# Patient Record
Sex: FEMALE | Race: BLACK OR AFRICAN AMERICAN | Employment: UNEMPLOYED | URBAN - METROPOLITAN AREA
[De-identification: names, ages, dates, MRNs, and addresses within clinical notes are randomized per-mention and may not be internally consistent; named-entity substitution may affect disease eponyms.]

---

## 2020-06-28 ENCOUNTER — HOSPITAL ENCOUNTER (INPATIENT)
Age: 25
LOS: 1 days | Discharge: HOME | DRG: 638 | End: 2020-06-30
Attending: EMERGENCY MEDICINE | Admitting: INTERNAL MEDICINE

## 2020-06-28 LAB
-: NORMAL
ABSOLUTE EOS #: <0.03 K/UL (ref 0–0.44)
ABSOLUTE IMMATURE GRANULOCYTE: 0.23 K/UL (ref 0–0.3)
ABSOLUTE LYMPH #: 2.69 K/UL (ref 1.1–3.7)
ABSOLUTE MONO #: 0.74 K/UL (ref 0.1–1.2)
ALLEN TEST: ABNORMAL
ANION GAP: 13 MMOL/L (ref 7–16)
BASOPHILS # BLD: 1 % (ref 0–2)
BASOPHILS ABSOLUTE: 0.08 K/UL (ref 0–0.2)
DIFFERENTIAL TYPE: ABNORMAL
EOSINOPHILS RELATIVE PERCENT: 0 % (ref 1–4)
FIO2: ABNORMAL
GFR NON-AFRICAN AMERICAN: >60 ML/MIN
GFR SERPL CREATININE-BSD FRML MDRD: >60 ML/MIN
GFR SERPL CREATININE-BSD FRML MDRD: NORMAL ML/MIN/{1.73_M2}
GLUCOSE BLD-MCNC: 420 MG/DL (ref 65–105)
GLUCOSE BLD-MCNC: 449 MG/DL (ref 74–100)
HCG QUALITATIVE: NEGATIVE
HCO3 VENOUS: 4.8 MMOL/L (ref 22–29)
HCT VFR BLD CALC: 53.2 % (ref 36.3–47.1)
HEMOGLOBIN: 16.4 G/DL (ref 11.9–15.1)
IMMATURE GRANULOCYTES: 2 %
LYMPHOCYTES # BLD: 21 % (ref 24–43)
MCH RBC QN AUTO: 28.7 PG (ref 25.2–33.5)
MCHC RBC AUTO-ENTMCNC: 30.8 G/DL (ref 28.4–34.8)
MCV RBC AUTO: 93 FL (ref 82.6–102.9)
MODE: ABNORMAL
MONOCYTES # BLD: 6 % (ref 3–12)
NEGATIVE BASE EXCESS, VEN: 22 (ref 0–2)
NRBC AUTOMATED: 0 PER 100 WBC
O2 DEVICE/FLOW/%: ABNORMAL
O2 SAT, VEN: 92 % (ref 60–85)
PATIENT TEMP: ABNORMAL
PCO2, VEN: 14.7 MM HG (ref 41–51)
PDW BLD-RTO: 16.2 % (ref 11.8–14.4)
PH VENOUS: 7.12 (ref 7.32–7.43)
PLATELET # BLD: 316 K/UL (ref 138–453)
PLATELET ESTIMATE: ABNORMAL
PMV BLD AUTO: 11.3 FL (ref 8.1–13.5)
PO2, VEN: 81.4 MM HG (ref 30–50)
POC CHLORIDE: 115 MMOL/L (ref 98–107)
POC CREATININE: 1.02 MG/DL (ref 0.51–1.19)
POC HEMATOCRIT: 55 % (ref 36–46)
POC HEMOGLOBIN: 18.8 G/DL (ref 12–16)
POC IONIZED CALCIUM: 1.06 MMOL/L (ref 1.15–1.33)
POC LACTIC ACID: 1.96 MMOL/L (ref 0.56–1.39)
POC PCO2 TEMP: ABNORMAL MM HG
POC PH TEMP: ABNORMAL
POC PO2 TEMP: ABNORMAL MM HG
POC POTASSIUM: 6.9 MMOL/L (ref 3.5–4.5)
POC SODIUM: 133 MMOL/L (ref 138–146)
POSITIVE BASE EXCESS, VEN: ABNORMAL (ref 0–3)
RBC # BLD: 5.72 M/UL (ref 3.95–5.11)
RBC # BLD: ABNORMAL 10*6/UL
REASON FOR REJECTION: NORMAL
SAMPLE SITE: ABNORMAL
SEG NEUTROPHILS: 70 % (ref 36–65)
SEGMENTED NEUTROPHILS ABSOLUTE COUNT: 9.11 K/UL (ref 1.5–8.1)
SERUM OSMOLALITY: 327 MOSM/KG (ref 275–295)
TOTAL CO2, VENOUS: 5 MMOL/L (ref 23–30)
WBC # BLD: 12.9 K/UL (ref 3.5–11.3)
WBC # BLD: ABNORMAL 10*3/UL
ZZ NTE CLEAN UP: ORDERED TEST: NORMAL
ZZ NTE WITH NAME CLEAN UP: SPECIMEN SOURCE: NORMAL

## 2020-06-28 PROCEDURE — 82803 BLOOD GASES ANY COMBINATION: CPT

## 2020-06-28 PROCEDURE — 80053 COMPREHEN METABOLIC PANEL: CPT

## 2020-06-28 PROCEDURE — 82947 ASSAY GLUCOSE BLOOD QUANT: CPT

## 2020-06-28 PROCEDURE — 82330 ASSAY OF CALCIUM: CPT

## 2020-06-28 PROCEDURE — 93005 ELECTROCARDIOGRAM TRACING: CPT | Performed by: STUDENT IN AN ORGANIZED HEALTH CARE EDUCATION/TRAINING PROGRAM

## 2020-06-28 PROCEDURE — 84132 ASSAY OF SERUM POTASSIUM: CPT

## 2020-06-28 PROCEDURE — 84703 CHORIONIC GONADOTROPIN ASSAY: CPT

## 2020-06-28 PROCEDURE — 83930 ASSAY OF BLOOD OSMOLALITY: CPT

## 2020-06-28 PROCEDURE — 85025 COMPLETE CBC W/AUTO DIFF WBC: CPT

## 2020-06-28 PROCEDURE — 81001 URINALYSIS AUTO W/SCOPE: CPT

## 2020-06-28 PROCEDURE — 85014 HEMATOCRIT: CPT

## 2020-06-28 PROCEDURE — 99285 EMERGENCY DEPT VISIT HI MDM: CPT

## 2020-06-28 PROCEDURE — 2580000003 HC RX 258: Performed by: STUDENT IN AN ORGANIZED HEALTH CARE EDUCATION/TRAINING PROGRAM

## 2020-06-28 PROCEDURE — 83605 ASSAY OF LACTIC ACID: CPT

## 2020-06-28 PROCEDURE — 84295 ASSAY OF SERUM SODIUM: CPT

## 2020-06-28 PROCEDURE — 82435 ASSAY OF BLOOD CHLORIDE: CPT

## 2020-06-28 PROCEDURE — 83735 ASSAY OF MAGNESIUM: CPT

## 2020-06-28 PROCEDURE — 6360000002 HC RX W HCPCS: Performed by: STUDENT IN AN ORGANIZED HEALTH CARE EDUCATION/TRAINING PROGRAM

## 2020-06-28 PROCEDURE — 82565 ASSAY OF CREATININE: CPT

## 2020-06-28 RX ORDER — 0.9 % SODIUM CHLORIDE 0.9 %
1000 INTRAVENOUS SOLUTION INTRAVENOUS ONCE
Status: COMPLETED | OUTPATIENT
Start: 2020-06-28 | End: 2020-06-29

## 2020-06-28 RX ORDER — NICOTINE POLACRILEX 4 MG
15 LOZENGE BUCCAL PRN
Status: DISCONTINUED | OUTPATIENT
Start: 2020-06-28 | End: 2020-06-30 | Stop reason: HOSPADM

## 2020-06-28 RX ORDER — ONDANSETRON 2 MG/ML
4 INJECTION INTRAMUSCULAR; INTRAVENOUS ONCE
Status: COMPLETED | OUTPATIENT
Start: 2020-06-28 | End: 2020-06-28

## 2020-06-28 RX ORDER — HUMAN INSULIN 100 [IU]/ML
INJECTION, SUSPENSION SUBCUTANEOUS
Status: ON HOLD | COMMUNITY
End: 2020-06-30 | Stop reason: SDUPTHER

## 2020-06-28 RX ORDER — KETOROLAC TROMETHAMINE 15 MG/ML
15 INJECTION, SOLUTION INTRAMUSCULAR; INTRAVENOUS ONCE
Status: COMPLETED | OUTPATIENT
Start: 2020-06-28 | End: 2020-06-28

## 2020-06-28 RX ORDER — DEXTROSE MONOHYDRATE 50 MG/ML
100 INJECTION, SOLUTION INTRAVENOUS PRN
Status: DISCONTINUED | OUTPATIENT
Start: 2020-06-28 | End: 2020-06-30 | Stop reason: HOSPADM

## 2020-06-28 RX ORDER — DEXTROSE MONOHYDRATE 25 G/50ML
12.5 INJECTION, SOLUTION INTRAVENOUS PRN
Status: DISCONTINUED | OUTPATIENT
Start: 2020-06-28 | End: 2020-06-30 | Stop reason: HOSPADM

## 2020-06-28 RX ADMIN — ONDANSETRON 4 MG: 2 INJECTION, SOLUTION INTRAMUSCULAR; INTRAVENOUS at 22:50

## 2020-06-28 RX ADMIN — KETOROLAC TROMETHAMINE 15 MG: 15 INJECTION, SOLUTION INTRAMUSCULAR; INTRAVENOUS at 23:28

## 2020-06-28 RX ADMIN — SODIUM CHLORIDE 1000 ML: 9 INJECTION, SOLUTION INTRAVENOUS at 22:45

## 2020-06-28 ASSESSMENT — ENCOUNTER SYMPTOMS
SORE THROAT: 0
VOMITING: 1
ABDOMINAL PAIN: 1
ABDOMINAL DISTENTION: 0
BACK PAIN: 0
WHEEZING: 0
RHINORRHEA: 0
CONSTIPATION: 0
PHOTOPHOBIA: 0
TROUBLE SWALLOWING: 0
SHORTNESS OF BREATH: 0
DIARRHEA: 0
NAUSEA: 1
CHEST TIGHTNESS: 0

## 2020-06-28 ASSESSMENT — PAIN DESCRIPTION - ONSET: ONSET: ON-GOING

## 2020-06-28 ASSESSMENT — PAIN DESCRIPTION - FREQUENCY: FREQUENCY: INTERMITTENT

## 2020-06-28 ASSESSMENT — PAIN DESCRIPTION - PAIN TYPE: TYPE: ACUTE PAIN

## 2020-06-28 ASSESSMENT — PAIN DESCRIPTION - LOCATION: LOCATION: ABDOMEN

## 2020-06-28 ASSESSMENT — PAIN SCALES - GENERAL
PAINLEVEL_OUTOF10: 9
PAINLEVEL_OUTOF10: 9

## 2020-06-28 ASSESSMENT — PAIN DESCRIPTION - DESCRIPTORS: DESCRIPTORS: ACHING

## 2020-06-28 ASSESSMENT — PAIN DESCRIPTION - ORIENTATION: ORIENTATION: LEFT

## 2020-06-29 PROBLEM — N17.9 AKI (ACUTE KIDNEY INJURY) (HCC): Status: ACTIVE | Noted: 2020-06-29

## 2020-06-29 PROBLEM — E10.10 DIABETIC KETOACIDOSIS WITHOUT COMA ASSOCIATED WITH TYPE 1 DIABETES MELLITUS (HCC): Status: ACTIVE | Noted: 2020-06-29

## 2020-06-29 PROBLEM — Z91.199 NONCOMPLIANCE: Status: ACTIVE | Noted: 2020-06-29

## 2020-06-29 LAB
-: NORMAL
ALBUMIN SERPL-MCNC: 4.2 G/DL (ref 3.5–5.2)
ALBUMIN/GLOBULIN RATIO: 1.1 (ref 1–2.5)
ALLEN TEST: ABNORMAL
ALLEN TEST: ABNORMAL
ALLEN TEST: POSITIVE
ALP BLD-CCNC: 110 U/L (ref 35–104)
ALT SERPL-CCNC: 18 U/L (ref 5–33)
AMORPHOUS: NORMAL
ANION GAP SERPL CALCULATED.3IONS-SCNC: 16 MMOL/L (ref 9–17)
ANION GAP SERPL CALCULATED.3IONS-SCNC: 19 MMOL/L (ref 9–17)
ANION GAP SERPL CALCULATED.3IONS-SCNC: ABNORMAL MMOL/L (ref 9–17)
ANION GAP: 13 MMOL/L (ref 7–16)
ANION GAP: 15 MMOL/L (ref 7–16)
AST SERPL-CCNC: 30 U/L
BACTERIA: NORMAL
BETA-HYDROXYBUTYRATE: 9.32 MMOL/L (ref 0.02–0.27)
BETA-HYDROXYBUTYRATE: 9.76 MMOL/L (ref 0.02–0.27)
BILIRUB SERPL-MCNC: 0.17 MG/DL (ref 0.3–1.2)
BILIRUBIN URINE: NEGATIVE
BUN BLDV-MCNC: 3 MG/DL (ref 6–20)
BUN BLDV-MCNC: 3 MG/DL (ref 6–20)
BUN BLDV-MCNC: 4 MG/DL (ref 6–20)
BUN BLDV-MCNC: 5 MG/DL (ref 6–20)
BUN BLDV-MCNC: 7 MG/DL (ref 6–20)
BUN/CREAT BLD: ABNORMAL (ref 9–20)
CALCIUM SERPL-MCNC: 7.8 MG/DL (ref 8.6–10.4)
CALCIUM SERPL-MCNC: 7.9 MG/DL (ref 8.6–10.4)
CALCIUM SERPL-MCNC: 8.4 MG/DL (ref 8.6–10.4)
CALCIUM SERPL-MCNC: 8.4 MG/DL (ref 8.6–10.4)
CALCIUM SERPL-MCNC: 8.5 MG/DL (ref 8.6–10.4)
CASTS UA: NORMAL /LPF (ref 0–8)
CHLORIDE BLD-SCNC: 100 MMOL/L (ref 98–107)
CHLORIDE BLD-SCNC: 107 MMOL/L (ref 98–107)
CHLORIDE BLD-SCNC: 108 MMOL/L (ref 98–107)
CHLORIDE BLD-SCNC: 109 MMOL/L (ref 98–107)
CHLORIDE BLD-SCNC: 111 MMOL/L (ref 98–107)
CHP ED QC CHECK: YES
CO2: 15 MMOL/L (ref 20–31)
CO2: 8 MMOL/L (ref 20–31)
CO2: <6 MMOL/L (ref 20–31)
COLOR: YELLOW
COMMENT UA: ABNORMAL
CREAT SERPL-MCNC: 0.8 MG/DL (ref 0.5–0.9)
CREAT SERPL-MCNC: 0.91 MG/DL (ref 0.5–0.9)
CREAT SERPL-MCNC: 0.99 MG/DL (ref 0.5–0.9)
CREAT SERPL-MCNC: 1.04 MG/DL (ref 0.5–0.9)
CREAT SERPL-MCNC: 1.12 MG/DL (ref 0.5–0.9)
CRYSTALS, UA: NORMAL /HPF
EKG ATRIAL RATE: 106 BPM
EKG P AXIS: 74 DEGREES
EKG P-R INTERVAL: 136 MS
EKG Q-T INTERVAL: 360 MS
EKG QRS DURATION: 72 MS
EKG QTC CALCULATION (BAZETT): 478 MS
EKG R AXIS: 23 DEGREES
EKG T AXIS: 48 DEGREES
EKG VENTRICULAR RATE: 106 BPM
EPITHELIAL CELLS UA: NORMAL /HPF (ref 0–5)
ESTIMATED AVERAGE GLUCOSE: 289 MG/DL
FIO2: ABNORMAL
GFR AFRICAN AMERICAN: >60 ML/MIN
GFR NON-AFRICAN AMERICAN: 60 ML/MIN
GFR NON-AFRICAN AMERICAN: >60 ML/MIN
GFR SERPL CREATININE-BSD FRML MDRD: >60 ML/MIN
GFR SERPL CREATININE-BSD FRML MDRD: >60 ML/MIN
GFR SERPL CREATININE-BSD FRML MDRD: ABNORMAL ML/MIN/{1.73_M2}
GFR SERPL CREATININE-BSD FRML MDRD: NORMAL ML/MIN/{1.73_M2}
GLUCOSE BLD-MCNC: 118 MG/DL (ref 65–105)
GLUCOSE BLD-MCNC: 122 MG/DL (ref 65–105)
GLUCOSE BLD-MCNC: 126 MG/DL (ref 70–99)
GLUCOSE BLD-MCNC: 132 MG/DL (ref 65–105)
GLUCOSE BLD-MCNC: 136 MG/DL
GLUCOSE BLD-MCNC: 136 MG/DL (ref 65–105)
GLUCOSE BLD-MCNC: 142 MG/DL
GLUCOSE BLD-MCNC: 142 MG/DL (ref 65–105)
GLUCOSE BLD-MCNC: 164 MG/DL
GLUCOSE BLD-MCNC: 164 MG/DL (ref 65–105)
GLUCOSE BLD-MCNC: 168 MG/DL
GLUCOSE BLD-MCNC: 168 MG/DL (ref 65–105)
GLUCOSE BLD-MCNC: 178 MG/DL
GLUCOSE BLD-MCNC: 178 MG/DL (ref 65–105)
GLUCOSE BLD-MCNC: 202 MG/DL (ref 70–99)
GLUCOSE BLD-MCNC: 207 MG/DL (ref 74–100)
GLUCOSE BLD-MCNC: 216 MG/DL
GLUCOSE BLD-MCNC: 216 MG/DL (ref 65–105)
GLUCOSE BLD-MCNC: 220 MG/DL
GLUCOSE BLD-MCNC: 220 MG/DL (ref 65–105)
GLUCOSE BLD-MCNC: 230 MG/DL
GLUCOSE BLD-MCNC: 230 MG/DL (ref 65–105)
GLUCOSE BLD-MCNC: 238 MG/DL
GLUCOSE BLD-MCNC: 238 MG/DL
GLUCOSE BLD-MCNC: 238 MG/DL (ref 65–105)
GLUCOSE BLD-MCNC: 238 MG/DL (ref 65–105)
GLUCOSE BLD-MCNC: 249 MG/DL
GLUCOSE BLD-MCNC: 249 MG/DL (ref 65–105)
GLUCOSE BLD-MCNC: 253 MG/DL
GLUCOSE BLD-MCNC: 253 MG/DL (ref 65–105)
GLUCOSE BLD-MCNC: 270 MG/DL (ref 70–99)
GLUCOSE BLD-MCNC: 274 MG/DL (ref 70–99)
GLUCOSE BLD-MCNC: 277 MG/DL (ref 74–100)
GLUCOSE BLD-MCNC: 288 MG/DL (ref 70–99)
GLUCOSE BLD-MCNC: 370 MG/DL
GLUCOSE BLD-MCNC: 370 MG/DL (ref 65–105)
GLUCOSE BLD-MCNC: 417 MG/DL (ref 65–105)
GLUCOSE BLD-MCNC: 96 MG/DL (ref 65–105)
GLUCOSE URINE: ABNORMAL
HBA1C MFR BLD: 11.7 % (ref 4–6)
HCO3 VENOUS: 11.1 MMOL/L (ref 22–29)
HCO3 VENOUS: 5.1 MMOL/L (ref 22–29)
KETONES, URINE: ABNORMAL
LEUKOCYTE ESTERASE, URINE: NEGATIVE
MAGNESIUM: 1.7 MG/DL (ref 1.6–2.6)
MAGNESIUM: 1.9 MG/DL (ref 1.6–2.6)
MAGNESIUM: 2.1 MG/DL (ref 1.6–2.6)
MAGNESIUM: 2.2 MG/DL (ref 1.6–2.6)
MODE: ABNORMAL
MUCUS: NORMAL
NEGATIVE BASE EXCESS, ART: 9 (ref 0–2)
NEGATIVE BASE EXCESS, VEN: 15 (ref 0–2)
NEGATIVE BASE EXCESS, VEN: 25 (ref 0–2)
NITRITE, URINE: NEGATIVE
O2 DEVICE/FLOW/%: ABNORMAL
O2 SAT, VEN: 73 % (ref 60–85)
O2 SAT, VEN: 81 % (ref 60–85)
OTHER OBSERVATIONS UA: NORMAL
PATIENT TEMP: ABNORMAL
PCO2, VEN: 21.7 MM HG (ref 41–51)
PCO2, VEN: 28.3 MM HG (ref 41–51)
PH UA: 5 (ref 5–8)
PH VENOUS: 6.98 (ref 7.32–7.43)
PH VENOUS: 7.2 (ref 7.32–7.43)
PHOSPHORUS: 0.9 MG/DL (ref 2.6–4.5)
PHOSPHORUS: 1.2 MG/DL (ref 2.6–4.5)
PHOSPHORUS: 1.7 MG/DL (ref 2.6–4.5)
PHOSPHORUS: 3.4 MG/DL (ref 2.6–4.5)
PO2, VEN: 54.4 MM HG (ref 30–50)
PO2, VEN: 57.6 MM HG (ref 30–50)
POC CHLORIDE: 114 MMOL/L (ref 98–107)
POC CHLORIDE: 120 MMOL/L (ref 98–107)
POC CREATININE: 0.5 MG/DL (ref 0.51–1.19)
POC CREATININE: 0.73 MG/DL (ref 0.51–1.19)
POC HCO3: 16 MMOL/L (ref 21–28)
POC HEMATOCRIT: 44 % (ref 36–46)
POC HEMATOCRIT: 51 % (ref 36–46)
POC HEMOGLOBIN: 15 G/DL (ref 12–16)
POC HEMOGLOBIN: 17.2 G/DL (ref 12–16)
POC IONIZED CALCIUM: 1.28 MMOL/L (ref 1.15–1.33)
POC IONIZED CALCIUM: 1.29 MMOL/L (ref 1.15–1.33)
POC LACTIC ACID: 0.72 MMOL/L (ref 0.56–1.39)
POC LACTIC ACID: 1.05 MMOL/L (ref 0.56–1.39)
POC O2 SATURATION: 96 % (ref 94–98)
POC PCO2 TEMP: ABNORMAL MM HG
POC PCO2: 30.7 MM HG (ref 35–48)
POC PH TEMP: ABNORMAL
POC PH: 7.32 (ref 7.35–7.45)
POC PO2 TEMP: ABNORMAL MM HG
POC PO2: 88.3 MM HG (ref 83–108)
POC POTASSIUM: 3.5 MMOL/L (ref 3.5–4.5)
POC POTASSIUM: 4.8 MMOL/L (ref 3.5–4.5)
POC SODIUM: 138 MMOL/L (ref 138–146)
POC SODIUM: 140 MMOL/L (ref 138–146)
POSITIVE BASE EXCESS, ART: ABNORMAL (ref 0–3)
POSITIVE BASE EXCESS, VEN: ABNORMAL (ref 0–3)
POSITIVE BASE EXCESS, VEN: ABNORMAL (ref 0–3)
POTASSIUM SERPL-SCNC: 3.4 MMOL/L (ref 3.7–5.3)
POTASSIUM SERPL-SCNC: 4.3 MMOL/L (ref 3.7–5.3)
POTASSIUM SERPL-SCNC: 4.8 MMOL/L (ref 3.7–5.3)
POTASSIUM SERPL-SCNC: 5 MMOL/L (ref 3.7–5.3)
POTASSIUM SERPL-SCNC: 5.8 MMOL/L (ref 3.7–5.3)
PROTEIN UA: ABNORMAL
RBC UA: NORMAL /HPF (ref 0–4)
REASON FOR REJECTION: NORMAL
REASON FOR REJECTION: NORMAL
RENAL EPITHELIAL, UA: NORMAL /HPF
SAMPLE SITE: ABNORMAL
SODIUM BLD-SCNC: 135 MMOL/L (ref 135–144)
SODIUM BLD-SCNC: 138 MMOL/L (ref 135–144)
SODIUM BLD-SCNC: 142 MMOL/L (ref 135–144)
SPECIFIC GRAVITY UA: 1.02 (ref 1–1.03)
TCO2 (CALC), ART: 17 MMOL/L (ref 22–29)
TOTAL CO2, VENOUS: 12 MMOL/L (ref 23–30)
TOTAL CO2, VENOUS: 6 MMOL/L (ref 23–30)
TOTAL PROTEIN: 8 G/DL (ref 6.4–8.3)
TRICHOMONAS: NORMAL
TURBIDITY: CLEAR
URINE HGB: ABNORMAL
UROBILINOGEN, URINE: NORMAL
WBC UA: NORMAL /HPF (ref 0–5)
YEAST: NORMAL
ZZ NTE CLEAN UP: ORDERED TEST: NORMAL
ZZ NTE CLEAN UP: ORDERED TEST: NORMAL
ZZ NTE WITH NAME CLEAN UP: SPECIMEN SOURCE: NORMAL
ZZ NTE WITH NAME CLEAN UP: SPECIMEN SOURCE: NORMAL

## 2020-06-29 PROCEDURE — 84295 ASSAY OF SERUM SODIUM: CPT

## 2020-06-29 PROCEDURE — 82803 BLOOD GASES ANY COMBINATION: CPT

## 2020-06-29 PROCEDURE — 2060000000 HC ICU INTERMEDIATE R&B

## 2020-06-29 PROCEDURE — 84100 ASSAY OF PHOSPHORUS: CPT

## 2020-06-29 PROCEDURE — 2580000003 HC RX 258

## 2020-06-29 PROCEDURE — 83605 ASSAY OF LACTIC ACID: CPT

## 2020-06-29 PROCEDURE — 82947 ASSAY GLUCOSE BLOOD QUANT: CPT

## 2020-06-29 PROCEDURE — 6360000002 HC RX W HCPCS: Performed by: NURSE PRACTITIONER

## 2020-06-29 PROCEDURE — 85014 HEMATOCRIT: CPT

## 2020-06-29 PROCEDURE — 83036 HEMOGLOBIN GLYCOSYLATED A1C: CPT

## 2020-06-29 PROCEDURE — 82565 ASSAY OF CREATININE: CPT

## 2020-06-29 PROCEDURE — 84132 ASSAY OF SERUM POTASSIUM: CPT

## 2020-06-29 PROCEDURE — 80048 BASIC METABOLIC PNL TOTAL CA: CPT

## 2020-06-29 PROCEDURE — 99222 1ST HOSP IP/OBS MODERATE 55: CPT | Performed by: INTERNAL MEDICINE

## 2020-06-29 PROCEDURE — 93010 ELECTROCARDIOGRAM REPORT: CPT | Performed by: INTERNAL MEDICINE

## 2020-06-29 PROCEDURE — 82010 KETONE BODYS QUAN: CPT

## 2020-06-29 PROCEDURE — 2580000003 HC RX 258: Performed by: NURSE PRACTITIONER

## 2020-06-29 PROCEDURE — 2580000003 HC RX 258: Performed by: STUDENT IN AN ORGANIZED HEALTH CARE EDUCATION/TRAINING PROGRAM

## 2020-06-29 PROCEDURE — 36600 WITHDRAWAL OF ARTERIAL BLOOD: CPT

## 2020-06-29 PROCEDURE — 6370000000 HC RX 637 (ALT 250 FOR IP): Performed by: STUDENT IN AN ORGANIZED HEALTH CARE EDUCATION/TRAINING PROGRAM

## 2020-06-29 PROCEDURE — 83735 ASSAY OF MAGNESIUM: CPT

## 2020-06-29 PROCEDURE — 82330 ASSAY OF CALCIUM: CPT

## 2020-06-29 PROCEDURE — 82435 ASSAY OF BLOOD CHLORIDE: CPT

## 2020-06-29 PROCEDURE — 80053 COMPREHEN METABOLIC PANEL: CPT

## 2020-06-29 RX ORDER — SODIUM CHLORIDE 450 MG/100ML
INJECTION, SOLUTION INTRAVENOUS CONTINUOUS
Status: DISCONTINUED | OUTPATIENT
Start: 2020-06-29 | End: 2020-06-30 | Stop reason: HOSPADM

## 2020-06-29 RX ORDER — DEXTROSE AND SODIUM CHLORIDE 5; .45 G/100ML; G/100ML
INJECTION, SOLUTION INTRAVENOUS
Status: DISPENSED
Start: 2020-06-29 | End: 2020-06-29

## 2020-06-29 RX ORDER — KETOROLAC TROMETHAMINE 30 MG/ML
30 INJECTION, SOLUTION INTRAMUSCULAR; INTRAVENOUS EVERY 6 HOURS PRN
Status: DISCONTINUED | OUTPATIENT
Start: 2020-06-29 | End: 2020-06-30 | Stop reason: HOSPADM

## 2020-06-29 RX ORDER — DEXTROSE AND SODIUM CHLORIDE 5; .45 G/100ML; G/100ML
INJECTION, SOLUTION INTRAVENOUS
Status: COMPLETED
Start: 2020-06-29 | End: 2020-06-29

## 2020-06-29 RX ORDER — DEXTROSE MONOHYDRATE 25 G/50ML
12.5 INJECTION, SOLUTION INTRAVENOUS PRN
Status: DISCONTINUED | OUTPATIENT
Start: 2020-06-29 | End: 2020-06-30 | Stop reason: SDUPTHER

## 2020-06-29 RX ORDER — DEXTROSE MONOHYDRATE 50 MG/ML
100 INJECTION, SOLUTION INTRAVENOUS PRN
Status: DISCONTINUED | OUTPATIENT
Start: 2020-06-29 | End: 2020-06-30 | Stop reason: SDUPTHER

## 2020-06-29 RX ORDER — NICOTINE POLACRILEX 4 MG
15 LOZENGE BUCCAL PRN
Status: DISCONTINUED | OUTPATIENT
Start: 2020-06-29 | End: 2020-06-30 | Stop reason: SDUPTHER

## 2020-06-29 RX ORDER — MORPHINE SULFATE 4 MG/ML
2 INJECTION, SOLUTION INTRAMUSCULAR; INTRAVENOUS ONCE
Status: COMPLETED | OUTPATIENT
Start: 2020-06-29 | End: 2020-06-29

## 2020-06-29 RX ORDER — 0.9 % SODIUM CHLORIDE 0.9 %
15 INTRAVENOUS SOLUTION INTRAVENOUS ONCE
Status: DISCONTINUED | OUTPATIENT
Start: 2020-06-29 | End: 2020-06-30 | Stop reason: HOSPADM

## 2020-06-29 RX ORDER — PROMETHAZINE HYDROCHLORIDE 25 MG/ML
12.5 INJECTION, SOLUTION INTRAMUSCULAR; INTRAVENOUS EVERY 4 HOURS PRN
Status: DISCONTINUED | OUTPATIENT
Start: 2020-06-29 | End: 2020-06-30 | Stop reason: HOSPADM

## 2020-06-29 RX ORDER — 0.9 % SODIUM CHLORIDE 0.9 %
1000 INTRAVENOUS SOLUTION INTRAVENOUS ONCE
Status: DISCONTINUED | OUTPATIENT
Start: 2020-06-29 | End: 2020-06-30 | Stop reason: HOSPADM

## 2020-06-29 RX ORDER — DEXTROSE AND SODIUM CHLORIDE 5; .45 G/100ML; G/100ML
INJECTION, SOLUTION INTRAVENOUS CONTINUOUS PRN
Status: DISCONTINUED | OUTPATIENT
Start: 2020-06-29 | End: 2020-06-30 | Stop reason: HOSPADM

## 2020-06-29 RX ORDER — POTASSIUM CHLORIDE 20 MEQ/1
40 TABLET, EXTENDED RELEASE ORAL 2 TIMES DAILY
Status: DISCONTINUED | OUTPATIENT
Start: 2020-06-29 | End: 2020-06-30

## 2020-06-29 RX ORDER — DEXTROSE MONOHYDRATE 25 G/50ML
12.5 INJECTION, SOLUTION INTRAVENOUS PRN
Status: DISCONTINUED | OUTPATIENT
Start: 2020-06-29 | End: 2020-06-30 | Stop reason: HOSPADM

## 2020-06-29 RX ORDER — POTASSIUM CHLORIDE 7.45 MG/ML
10 INJECTION INTRAVENOUS PRN
Status: DISCONTINUED | OUTPATIENT
Start: 2020-06-29 | End: 2020-06-30 | Stop reason: HOSPADM

## 2020-06-29 RX ORDER — MAGNESIUM SULFATE 1 G/100ML
1 INJECTION INTRAVENOUS PRN
Status: DISCONTINUED | OUTPATIENT
Start: 2020-06-29 | End: 2020-06-30 | Stop reason: HOSPADM

## 2020-06-29 RX ORDER — ONDANSETRON 2 MG/ML
4 INJECTION INTRAMUSCULAR; INTRAVENOUS EVERY 6 HOURS PRN
Status: DISCONTINUED | OUTPATIENT
Start: 2020-06-29 | End: 2020-06-30 | Stop reason: HOSPADM

## 2020-06-29 RX ORDER — 0.9 % SODIUM CHLORIDE 0.9 %
1000 INTRAVENOUS SOLUTION INTRAVENOUS ONCE
Status: COMPLETED | OUTPATIENT
Start: 2020-06-29 | End: 2020-06-29

## 2020-06-29 RX ORDER — POTASSIUM CHLORIDE 20 MEQ/1
40 TABLET, EXTENDED RELEASE ORAL 2 TIMES DAILY
Status: DISCONTINUED | OUTPATIENT
Start: 2020-06-29 | End: 2020-06-29

## 2020-06-29 RX ADMIN — SODIUM CHLORIDE 1000 ML: 9 INJECTION, SOLUTION INTRAVENOUS at 03:45

## 2020-06-29 RX ADMIN — SODIUM CHLORIDE 0.15 UNITS/KG/HR: 9 INJECTION, SOLUTION INTRAVENOUS at 01:48

## 2020-06-29 RX ADMIN — DEXTROSE AND SODIUM CHLORIDE: 5; 450 INJECTION, SOLUTION INTRAVENOUS at 18:45

## 2020-06-29 RX ADMIN — SODIUM CHLORIDE 0.05 UNITS/KG/HR: 9 INJECTION, SOLUTION INTRAVENOUS at 05:13

## 2020-06-29 RX ADMIN — PROMETHAZINE HYDROCHLORIDE 12.5 MG: 25 INJECTION INTRAMUSCULAR; INTRAVENOUS at 02:35

## 2020-06-29 RX ADMIN — POTASSIUM CHLORIDE 40 MEQ: 1500 TABLET, EXTENDED RELEASE ORAL at 04:14

## 2020-06-29 RX ADMIN — ENOXAPARIN SODIUM 40 MG: 40 INJECTION SUBCUTANEOUS at 12:09

## 2020-06-29 RX ADMIN — SODIUM CHLORIDE 0.1 UNITS/KG/HR: 9 INJECTION, SOLUTION INTRAVENOUS at 00:39

## 2020-06-29 RX ADMIN — DEXTROSE AND SODIUM CHLORIDE: 5; 450 INJECTION, SOLUTION INTRAVENOUS at 11:13

## 2020-06-29 RX ADMIN — DEXTROSE AND SODIUM CHLORIDE: 5; 450 INJECTION, SOLUTION INTRAVENOUS at 05:23

## 2020-06-29 RX ADMIN — SODIUM CHLORIDE 1000 ML: 9 INJECTION, SOLUTION INTRAVENOUS at 02:02

## 2020-06-29 RX ADMIN — SODIUM CHLORIDE 0.13 UNITS/KG/HR: 9 INJECTION, SOLUTION INTRAVENOUS at 11:11

## 2020-06-29 RX ADMIN — MORPHINE SULFATE 2 MG: 4 INJECTION INTRAVENOUS at 02:35

## 2020-06-29 ASSESSMENT — PAIN SCALES - GENERAL
PAINLEVEL_OUTOF10: 4
PAINLEVEL_OUTOF10: 0
PAINLEVEL_OUTOF10: 7

## 2020-06-29 NOTE — ED NOTES
Pt report from [facility], pt resting on cot respirations even and unlabored, pt on an insulin drip 14.2ml/hr, pt denies needs at this time, waiting for bed placement on floor     Noemi Saunders RN  06/29/20 [id number]

## 2020-06-29 NOTE — ED NOTES
Pt resting on the bed, no acute distress noted.  Awaiting critical care consult     Odessa Abad RN  06/29/20 [id number]

## 2020-06-29 NOTE — H&P
[facility]    HISTORY AND PHYSICAL EXAMINATION            Date:   6/29/2020  Patient name:  Becky Bustamante  Date of admission:  6/28/2020 10:30 PM  MRN:   [id number]  Account:  [de-identified]  YOB: 1995  PCP:    No primary care provider on file. Room:   21/21  Code Status:    Full Code    Chief Complaint:     Chief Complaint   Patient presents with    Hyperglycemia     History Obtained From:     patient, electronic medical record    History of Present Illness:     27 yo insulin dependent Dm2 visiting from out of town presented to ED with c/o nausea, vomiting for 2 days. Denies abdominal pain, no chest pain/SOB/fever/chills. No dysuria. denies known sick contacts. Past Medical History:     No past medical history on file. Past Surgical History:     No past surgical history on file. Medications Prior to Admission:     Prior to Admission medications    Medication Sig Start Date End Date Taking? Authorizing Provider   insulin regular (HUMULIN R;NOVOLIN R) 100 UNIT/ML injection Inject into the skin See Admin Instructions   Yes Historical Provider, MD   insulin NPH (NOVOLIN N) 100 UNIT/ML injection vial Inject into the skin 2 times daily (before meals)   Yes Historical Provider, MD        Allergies:     Patient has no known allergies. Social History:     Tobacco:    has no history on file for tobacco.  Alcohol:      has no history on file for alcohol. Drug Use:  has no history on file for drug. Family History:     No family history on file. Review of Systems:     Positive and Negative as described in HPI.     CONSTITUTIONAL:  negative for fevers, chills, sweats,+ fatigue, weight loss  HEENT:  negative for vision, hearing changes, runny nose, throat pain  RESPIRATORY:  negative for shortness of breath, cough, congestion, wheezing  CARDIOVASCULAR:  negative for chest pain, palpitations  GASTROINTESTINAL:  +nausea, tone and bulk, no abnormal sensation, normal speech, cranial nerves II through XII grossly intact  Skin: No gross lesions, rashes, bruising or bleeding on exposed skin area  Extremities: peripheral pulses palpable, no pedal edema or calf pain with palpation      Investigations:      Laboratory Testing:  Recent Results (from the past 24 hour(s))   POC Glucose Fingerstick    Collection Time: 06/28/20 10:36 PM   Result Value Ref Range    POC Glucose 420 (HH) 65 - 105 mg/dL   Venous Blood Gas, POC    Collection Time: 06/28/20 10:54 PM   Result Value Ref Range    pH, [place] 7.125 (LL) 7.320 - 7.430    pCO2, [place] 14.7 (L) 41.0 - 51.0 mm Hg    pO2, [place] 81.4 (H) 30.0 - 50.0 mm Hg    HCO3, Venous 4.8 (L) 22.0 - 29.0 mmol/L    Total CO2, Venous 5 (L) 23.0 - 30.0 mmol/L    Negative Base Excess, [place] 22 (H) 0.0 - 2.0    Positive Base Excess, [place] NOT REPORTED 0.0 - 3.0    O2 Sat, [place] 92 (H) 60.0 - 85.0 %    O2 Device/Flow/% NOT REPORTED     Arsen Test NOT REPORTED     Sample Site NOT REPORTED     Mode NOT REPORTED     FIO2 NOT REPORTED     Pt Temp NOT REPORTED     POC pH Temp NOT REPORTED     POC pCO2 Temp NOT REPORTED mm Hg    POC pO2 Temp NOT REPORTED mm Hg   Hemoglobin and hematocrit, blood    Collection Time: 06/28/20 10:54 PM   Result Value Ref Range    POC Hemoglobin 18.8 (H) 12.0 - 16.0 g/dL    POC Hematocrit 55 (H) 36 - 46 %   Creatinine W/GFR Point of Care    Collection Time: 06/28/20 10:54 PM   Result Value Ref Range    POC Creatinine 1.02 0.51 - 1.19 mg/dL    GFR Comment >60 >60 mL/min    GFR Non-African American >60 >60 mL/min    GFR Comment         SODIUM (POC)    Collection Time: 06/28/20 10:54 PM   Result Value Ref Range    POC Sodium 133 (L) 138 - 146 mmol/L   POTASSIUM (POC)    Collection Time: 06/28/20 10:54 PM   Result Value Ref Range    POC Potassium 6.9 (HH) 3.5 - 4.5 mmol/L   CHLORIDE (POC)    Collection Time: 06/28/20 10:54 PM   Result Value Ref Range    POC Chloride 115 (H) 98 - 107 mmol/L   CALCIUM, IONIC (POC)    Collection Time: 06/28/20 10:54 PM   Result Value Ref Range    POC Ionized Calcium 1.06 (L) 1.15 - 1.33 mmol/L   Lactic Acid, POC    Collection Time: 06/28/20 10:54 PM   Result Value Ref Range    POC Lactic Acid 1.96 (H) 0.56 - 1.39 mmol/L   POCT Glucose    Collection Time: 06/28/20 10:54 PM   Result Value Ref Range    POC Glucose 449 (HH) 74 - 100 mg/dL   Anion Gap (Calc) POC    Collection Time: 06/28/20 10:54 PM   Result Value Ref Range    Anion Gap 13 7 - 16 mmol/L   CBC Auto Differential    Collection Time: 06/28/20 11:12 PM   Result Value Ref Range    WBC 12.9 (H) 3.5 - 11.3 k/uL    RBC 5.72 (H) 3.95 - 5.11 m/uL    Hemoglobin 16.4 (H) 11.9 - 15.1 g/dL    Hematocrit 53.2 (H) 36.3 - 47.1 %    MCV 93.0 82.6 - 102.9 fL    MCH 28.7 25.2 - 33.5 pg    MCHC 30.8 28.4 - 34.8 g/dL    RDW 16.2 (H) 11.8 - 14.4 %    Platelets 014 656 - 091 k/uL    MPV 11.3 8.1 - 13.5 fL    NRBC Automated 0.0 0.0 per 100 WBC    Differential Type NOT REPORTED     Seg Neutrophils 70 (H) 36 - 65 %    Lymphocytes 21 (L) 24 - 43 %    Monocytes 6 3 - 12 %    Eosinophils % 0 (L) 1 - 4 %    Basophils 1 0 - 2 %    Immature Granulocytes 2 (H) 0 %    Segs Absolute 9.11 (H) 1.50 - 8.10 k/uL    Absolute Lymph # 2.69 1.10 - 3.70 k/uL    Absolute Mono # 0.74 0.10 - 1.20 k/uL    Absolute Eos # <0.03 0.00 - 0.44 k/uL    Basophils Absolute 0.08 0.00 - 0.20 k/uL    Absolute Immature Granulocyte 0.23 0.00 - 0.30 k/uL    WBC Morphology NOT REPORTED     RBC Morphology ANISOCYTOSIS PRESENT     Platelet Estimate NOT REPORTED    Osmolality    Collection Time: 06/28/20 11:12 PM   Result Value Ref Range    Serum Osmolality 327 (HH) 275 - 295 mOsm/kg   HCG Qualitative, Serum    Collection Time: 06/28/20 11:12 PM   Result Value Ref Range    hCG Qual NEGATIVE NEGATIVE   SPECIMEN REJECTION    Collection Time: 06/28/20 11:12 PM   Result Value Ref Range    Specimen Source . BLOOD     Ordered Test CP,MG     Reason for Rejection Unable to perform testing: Specimen hemolyzed. - NOT REPORTED    SPECIMEN REJECTION    Collection Time: 06/29/20 12:16 AM   Result Value Ref Range    Specimen Source . BLOOD     Ordered Test CP,MG     Reason for Rejection Unable to perform testing: Specimen hemolyzed. - NOT REPORTED    POC Glucose Fingerstick    Collection Time: 06/29/20 12:27 AM   Result Value Ref Range    POC Glucose 417 (HH) 65 - 105 mg/dL   POC Glucose Fingerstick    Collection Time: 06/29/20  1:46 AM   Result Value Ref Range    POC Glucose 370 (H) 65 - 105 mg/dL   POCT glucose    Collection Time: 06/29/20  2:02 AM   Result Value Ref Range    Glucose 370 mg/dL    QC OK? yes    Beta-Hydroxybutyrate    Collection Time: 06/29/20  3:10 AM   Result Value Ref Range    Beta-Hydroxybutyrate 9.76 (H) 0.02 - 0.27 mmol/L   Comprehensive Metabolic Panel    Collection Time: 06/29/20  3:21 AM   Result Value Ref Range    Glucose 288 (H) 70 - 99 mg/dL    BUN 7 6 - 20 mg/dL    CREATININE 1.12 (H) 0.50 - 0.90 mg/dL    Bun/Cre Ratio NOT REPORTED 9 - 20    Calcium 8.4 (L) 8.6 - 10.4 mg/dL    Sodium 135 135 - 144 mmol/L    Potassium 5.0 3.7 - 5.3 mmol/L    Chloride 100 98 - 107 mmol/L    CO2 <6 (LL) 20 - 31 mmol/L    Anion Gap  9 - 17 mmol/L     Unable to calculate anion gap due to CO2 less than 6.     Alkaline Phosphatase 110 (H) 35 - 104 U/L    ALT 18 5 - 33 U/L    AST 30 <32 U/L    Total Bilirubin 0.17 (L) 0.3 - 1.2 mg/dL    Total Protein 8.0 6.4 - 8.3 g/dL    Alb 4.2 3.5 - 5.2 g/dL    Albumin/Globulin Ratio 1.1 1.0 - 2.5    GFR Non-African American 60 (L) >60 mL/min    GFR African American >60 >60 mL/min    GFR Comment          GFR Staging NOT REPORTED    Magnesium    Collection Time: 06/29/20  3:21 AM   Result Value Ref Range    Magnesium 2.1 1.6 - 2.6 mg/dL   Phosphorus    Collection Time: 06/29/20  3:21 AM   Result Value Ref Range    Phosphorus 3.4 2.6 - 4.5 mg/dL   POC Glucose Fingerstick    Collection Time: 06/29/20  5:07 AM   Result Value Ref Range    POC Glucose 220 (H) 65 - 105 mg/dL POCT glucose    Collection Time: 06/29/20  5:15 AM   Result Value Ref Range    Glucose 220 mg/dL    QC OK? yes    POC Glucose Fingerstick    Collection Time: 06/29/20  6:25 AM   Result Value Ref Range    POC Glucose 230 (H) 65 - 105 mg/dL   POCT glucose    Collection Time: 06/29/20  6:30 AM   Result Value Ref Range    Glucose 230 mg/dL    QC OK?  yes    Venous Blood Gas, POC    Collection Time: 06/29/20  7:03 AM   Result Value Ref Range    pH, [place] 6.981 (LL) 7.320 - 7.430    pCO2, [place] 21.7 (L) 41.0 - 51.0 mm Hg    pO2, [place] 57.6 (H) 30.0 - 50.0 mm Hg    HCO3, Venous 5.1 (L) 22.0 - 29.0 mmol/L    Total CO2, Venous 6 (L) 23.0 - 30.0 mmol/L    Negative Base Excess, [place] 25 (H) 0.0 - 2.0    Positive Base Excess, [place] NOT REPORTED 0.0 - 3.0    O2 Sat, [place] 73 60.0 - 85.0 %    O2 Device/Flow/% NOT REPORTED     Arsen Test NOT REPORTED     Sample Site NOT REPORTED     Mode NOT REPORTED     FIO2 NOT REPORTED     Pt Temp NOT REPORTED     POC pH Temp NOT REPORTED     POC pCO2 Temp NOT REPORTED mm Hg    POC pO2 Temp NOT REPORTED mm Hg   Hemoglobin and hematocrit, blood    Collection Time: 06/29/20  7:03 AM   Result Value Ref Range    POC Hemoglobin 17.2 (H) 12.0 - 16.0 g/dL    POC Hematocrit 51 (H) 36 - 46 %   Creatinine W/GFR Point of Care    Collection Time: 06/29/20  7:03 AM   Result Value Ref Range    POC Creatinine 0.73 0.51 - 1.19 mg/dL    GFR Comment >60 >60 mL/min    GFR Non-African American >60 >60 mL/min    GFR Comment         SODIUM (POC)    Collection Time: 06/29/20  7:03 AM   Result Value Ref Range    POC Sodium 138 138 - 146 mmol/L   POTASSIUM (POC)    Collection Time: 06/29/20  7:03 AM   Result Value Ref Range    POC Potassium 4.8 (H) 3.5 - 4.5 mmol/L   CHLORIDE (POC)    Collection Time: 06/29/20  7:03 AM   Result Value Ref Range    POC Chloride 120 (H) 98 - 107 mmol/L   CALCIUM, IONIC (POC)    Collection Time: 06/29/20  7:03 AM   Result Value Ref Range    POC Ionized Calcium 1.28 1.15 - 1.33 mmol/L   Lactic Acid, prophylaxis. Consultations:   IP CONSULT TO HOSPITALIST  IP CONSULT TO DIABETES EDUCATOR  IP CONSULT TO DIETITIAN     Patient is admitted as inpatient status because of co-morbidities listed above, severity of signs and symptoms as outlined, requirement for current medical therapies and most importantly because of direct risk to patient if care not provided in a hospital setting. Lesley Valdez MD  6/29/2020  8:15 AM    Copy sent to Dr. Garcia primary care provider on file.

## 2020-06-29 NOTE — ED PROVIDER NOTES
[address] [facility]     Emergency Department     Faculty Attestation    I performed a history and physical examination of the patient and discussed management with the resident. I reviewed the residents note and agree with the documented findings and plan of care. Any areas of disagreement are noted on the chart. I was personally present for the key portions of any procedures. I have documented in the chart those procedures where I was not present during the key portions. I have reviewed the emergency nurses triage note. I agree with the chief complaint, past medical history, past surgical history, allergies, medications, social and family history as documented unless otherwise noted below. For Physician Assistant/ Nurse Practitioner cases/documentation I have personally evaluated this patient and have completed at least one if not all key elements of the E/M (history, physical exam, and MDM). Additional findings are as noted. I have personally seen and evaluated the patient. I find the patient's history and physical exam are consistent with the NP/PA documentation. I agree with the care provided, treatment rendered, disposition and follow-up plan. 55-year-old female with a history of type 1 diabetes presenting out of her insulin with nausea, vomiting, and abdominal pain. Blood sugar 424 by EMS. Patient denies any chest pain, cough, or fever. Exam:  General: Laying on the bed and awake, alert  CV: regular rhythm and Tachycardic  Lungs: Rapid, deep breathing. No hypoxemia. Lungs clear to auscultation bilaterally. Abdomen: non-distended, Soft, mildly generalized tenderness    Plan:  Labs to evaluate for DKA  Hydration  Anticipate admission    Patient signed out to Dr. Libby Us pending lab results and disposition. Please see their note for the remainder of this patient's care.         William Dixon MD   Attending Emergency  Physician                William Dixon MD  06/29/20

## 2020-06-29 NOTE — ED PROVIDER NOTES
[facility] ED  Emergency Department  Emergency Medicine Resident Sign-out     Care of Mai Pham was assumed from Dr. Marj Hyatt and is being seen for Hyperglycemia  . The patient's initial evaluation and plan have been discussed with the prior provider who initially evaluated the patient.      EMERGENCY DEPARTMENT COURSE / MEDICAL DECISION MAKING:       MEDICATIONS GIVEN:  Orders Placed This Encounter   Medications    ondansetron (ZOFRAN) injection 4 mg    ketorolac (TORADOL) injection 15 mg    0.9 % sodium chloride bolus    0.9 % sodium chloride bolus    DISCONTD: insulin regular (HUMULIN R;NOVOLIN R) 100 Units in sodium chloride 0.9 % 100 mL infusion    glucose (GLUTOSE) 40 % oral gel 15 g    dextrose 50 % IV solution    glucagon (rDNA) injection 1 mg    dextrose 5 % solution    0.9 % sodium chloride bolus    glucose (GLUTOSE) 40 % oral gel 15 g    dextrose 50 % IV solution    glucagon (rDNA) injection 1 mg    dextrose 5 % solution    insulin regular (HUMULIN R;NOVOLIN R) 100 Units in sodium chloride 0.9 % 100 mL infusion    FOLLOWED BY Linked Order Group     0.9 % sodium chloride bolus     0.45 % sodium chloride infusion    dextrose 5 % and 0.45 % sodium chloride infusion    enoxaparin (LOVENOX) injection 40 mg    dextrose 50 % IV solution    potassium chloride 10 mEq/100 mL IVPB (Peripheral Line)    magnesium sulfate 1 g in dextrose 5% 100 mL IVPB    OR Linked Order Group     sodium phosphate 10 mmol in dextrose 5 % 250 mL IVPB     sodium phosphate 15 mmol in dextrose 5 % 250 mL IVPB     sodium phosphate 20 mmol in dextrose 5 % 500 mL IVPB    ketorolac (TORADOL) injection 30 mg    ondansetron (ZOFRAN) injection 4 mg    promethazine (PHENERGAN) injection 12.5 mg    morphine injection 2 mg    0.9 % sodium chloride bolus    DISCONTD: potassium chloride (KLOR-CON M) extended release tablet 40 mEq    potassium chloride (KLOR-CON M) extended release tablet 40 mEq  dextrose 5 % and 0.45 % NaCl 5-0.45 % infusion     GREGORIO MALLORY: adelita override       LABS / RADIOLOGY:     Labs Reviewed   CBC WITH AUTO DIFFERENTIAL - Abnormal; Notable for the following components:       Result Value    WBC 12.9 (*)     RBC 5.72 (*)     Hemoglobin 16.4 (*)     Hematocrit 53.2 (*)     RDW 16.2 (*)     Seg Neutrophils 70 (*)     Lymphocytes 21 (*)     Eosinophils % 0 (*)     Immature Granulocytes 2 (*)     Segs Absolute 9.11 (*)     All other components within normal limits   URINALYSIS - Abnormal; Notable for the following components:    Glucose, Ur 3+ (*)     Ketones, Urine LARGE (*)     Urine Hgb MODERATE (*)     Protein, UA 2+ (*)     All other components within normal limits   OSMOLALITY - Abnormal; Notable for the following components:    Serum Osmolality 327 (*)     All other components within normal limits   HGB/HCT - Abnormal; Notable for the following components:    POC Hemoglobin 18.8 (*)     POC Hematocrit 55 (*)     All other components within normal limits   SODIUM (POC) - Abnormal; Notable for the following components:    POC Sodium 133 (*)     All other components within normal limits   POTASSIUM (POC) - Abnormal; Notable for the following components:    POC Potassium 6.9 (*)     All other components within normal limits   CHLORIDE (POC) - Abnormal; Notable for the following components:    POC Chloride 115 (*)     All other components within normal limits   CALCIUM, IONIC (POC) - Abnormal; Notable for the following components:    POC Ionized Calcium 1.06 (*)     All other components within normal limits   HEMOGLOBIN A1C - Abnormal; Notable for the following components:    Hemoglobin A1C 11.7 (*)     All other components within normal limits   COMPREHENSIVE METABOLIC PANEL - Abnormal; Notable for the following components:    Glucose 288 (*)     CREATININE 1.12 (*)     Calcium 8.4 (*)     CO2 <6 (*)     Alkaline Phosphatase 110 (*)     Total Bilirubin 0.17 (*)     GFR Non- 60 (*)     All other components within normal limits   BASIC METABOLIC PANEL - Abnormal; Notable for the following components:    Glucose 274 (*)     BUN 4 (*)     CREATININE 1.04 (*)     Calcium 7.9 (*)     Chloride 109 (*)     CO2 <6 (*)     All other components within normal limits   PHOSPHORUS - Abnormal; Notable for the following components:    Phosphorus 1.7 (*)     All other components within normal limits   BETA-HYDROXYBUTYRATE - Abnormal; Notable for the following components:    Beta-Hydroxybutyrate 9.76 (*)     All other components within normal limits   BASIC METABOLIC PANEL - Abnormal; Notable for the following components:    Glucose 270 (*)     BUN 5 (*)     CREATININE 0.99 (*)     Calcium 7.8 (*)     CO2 <6 (*)     All other components within normal limits   BETA-HYDROXYBUTYRATE - Abnormal; Notable for the following components:    Beta-Hydroxybutyrate 9.32 (*)     All other components within normal limits   HGB/HCT - Abnormal; Notable for the following components:    POC Hemoglobin 17.2 (*)     POC Hematocrit 51 (*)     All other components within normal limits   POTASSIUM (POC) - Abnormal; Notable for the following components:    POC Potassium 4.8 (*)     All other components within normal limits   CHLORIDE (POC) - Abnormal; Notable for the following components:    POC Chloride 120 (*)     All other components within normal limits   BASIC METABOLIC PANEL - Abnormal; Notable for the following components:    Glucose 202 (*)     BUN 3 (*)     CREATININE 0.91 (*)     Calcium 8.5 (*)     Potassium 5.8 (*)     Chloride 108 (*)     CO2 8 (*)     Anion Gap 19 (*)     All other components within normal limits   PHOSPHORUS - Abnormal; Notable for the following components:    Phosphorus 1.2 (*)     All other components within normal limits   CHLORIDE (POC) - Abnormal; Notable for the following components:    POC Chloride 114 (*)     All other components within normal limits   POC GLUCOSE FINGERSTICK - Abnormal; Notable for the following components:    POC Glucose 420 (*)     All other components within normal limits   VENOUS BLOOD GAS, POINT OF CARE - Abnormal; Notable for the following components:    pH, [place] 7.125 (*)     pCO2, [place] 14.7 (*)     pO2, [place] 81.4 (*)     HCO3, Venous 4.8 (*)     Total CO2, Venous 5 (*)     Negative Base Excess, [place] 22 (*)     O2 Sat, [place] 92 (*)     All other components within normal limits   LACTIC ACID,POINT OF CARE - Abnormal; Notable for the following components:    POC Lactic Acid 1.96 (*)     All other components within normal limits   POCT GLUCOSE - Abnormal; Notable for the following components:    POC Glucose 449 (*)     All other components within normal limits   POC GLUCOSE FINGERSTICK - Abnormal; Notable for the following components:    POC Glucose 417 (*)     All other components within normal limits   POC GLUCOSE FINGERSTICK - Abnormal; Notable for the following components:    POC Glucose 370 (*)     All other components within normal limits   POC GLUCOSE FINGERSTICK - Abnormal; Notable for the following components:    POC Glucose 220 (*)     All other components within normal limits   POC GLUCOSE FINGERSTICK - Abnormal; Notable for the following components:    POC Glucose 230 (*)     All other components within normal limits   VENOUS BLOOD GAS, POINT OF CARE - Abnormal; Notable for the following components:    pH, [place] 6.981 (*)     pCO2, [place] 21.7 (*)     pO2, [place] 57.6 (*)     HCO3, Venous 5.1 (*)     Total CO2, Venous 6 (*)     Negative Base Excess, [place] 25 (*)     All other components within normal limits   POCT GLUCOSE - Abnormal; Notable for the following components:    POC Glucose 277 (*)     All other components within normal limits   POC GLUCOSE FINGERSTICK - Abnormal; Notable for the following components:    POC Glucose 253 (*)     All other components within normal limits   POC GLUCOSE FINGERSTICK - Abnormal; Notable for the following components: POC Glucose 249 (*)     All other components within normal limits   POC GLUCOSE FINGERSTICK - Abnormal; Notable for the following components:    POC Glucose 216 (*)     All other components within normal limits   POC GLUCOSE FINGERSTICK - Abnormal; Notable for the following components:    POC Glucose 238 (*)     All other components within normal limits   POC GLUCOSE FINGERSTICK - Abnormal; Notable for the following components:    POC Glucose 238 (*)     All other components within normal limits   POC GLUCOSE FINGERSTICK - Abnormal; Notable for the following components:    POC Glucose 178 (*)     All other components within normal limits   VENOUS BLOOD GAS, POINT OF CARE - Abnormal; Notable for the following components:    pH, [place] 7.201 (*)     pCO2, [place] 28.3 (*)     pO2, [place] 54.4 (*)     HCO3, Venous 11.1 (*)     Total CO2, Venous 12 (*)     Negative Base Excess, [place] 15 (*)     All other components within normal limits   CREATININE W/GFR POINT OF CARE - Abnormal; Notable for the following components:    POC Creatinine 0.50 (*)     All other components within normal limits   POCT GLUCOSE - Abnormal; Notable for the following components:    POC Glucose 207 (*)     All other components within normal limits   POC GLUCOSE FINGERSTICK - Abnormal; Notable for the following components:    POC Glucose 168 (*)     All other components within normal limits   POCT GLUCOSE - Normal   POCT GLUCOSE - Normal   POCT GLUCOSE - Normal   POCT GLUCOSE - Normal   POCT GLUCOSE - Normal   POCT GLUCOSE - Normal   POCT GLUCOSE - Normal   POCT GLUCOSE - Normal   POCT GLUCOSE - Normal   POCT GLUCOSE - Normal   HCG, SERUM, QUALITATIVE   SPECIMEN REJECTION   SPECIMEN REJECTION   MICROSCOPIC URINALYSIS   MAGNESIUM   MAGNESIUM   PHOSPHORUS   SODIUM (POC)   CALCIUM, IONIC (POC)   SPECIMEN REJECTION   MAGNESIUM   HGB/HCT   SODIUM (POC)   POTASSIUM (POC)   CALCIUM, IONIC (POC)   PREVIOUS SPECIMEN   PREVIOUS SPECIMEN   PREVIOUS SPECIMEN   BASIC METABOLIC PANEL   MAGNESIUM   PHOSPHORUS   BASIC METABOLIC PANEL   MAGNESIUM   PHOSPHORUS   BASIC METABOLIC PANEL   MAGNESIUM   PHOSPHORUS   PREVIOUS SPECIMEN   POC BLOOD GAS AND CHEMISTRY   CREATININE W/GFR POINT OF CARE   ANION GAP (CALC) POC   POCT GLUCOSE   POCT GLUCOSE   POCT GLUCOSE   POCT GLUCOSE   POCT GLUCOSE   POCT GLUCOSE   POCT GLUCOSE   POCT GLUCOSE   POCT GLUCOSE   POCT GLUCOSE   POCT GLUCOSE   POCT GLUCOSE   POCT GLUCOSE   POC BLOOD GAS   CREATININE W/GFR POINT OF CARE   LACTIC ACID,POINT OF CARE   ANION GAP ([address] [facility]) POC   LACTIC ACID,POINT OF CARE   ANION GAP (CALC) POC   POCT GLUCOSE   POCT GLUCOSE   POCT GLUCOSE   POCT GLUCOSE   POCT GLUCOSE   POCT GLUCOSE   POCT GLUCOSE   POCT GLUCOSE   POCT GLUCOSE   POCT GLUCOSE   POCT GLUCOSE   POCT GLUCOSE   POCT GLUCOSE   POCT GLUCOSE   POCT GLUCOSE   POCT GLUCOSE   POCT GLUCOSE   POCT GLUCOSE   POCT GLUCOSE   POCT GLUCOSE   POCT GLUCOSE   POCT GLUCOSE   POCT GLUCOSE   POCT GLUCOSE   POCT GLUCOSE   POCT GLUCOSE   POCT GLUCOSE   POCT GLUCOSE   POCT GLUCOSE   POCT GLUCOSE   POCT GLUCOSE   POCT GLUCOSE   POCT GLUCOSE   POCT GLUCOSE   POCT GLUCOSE   POCT GLUCOSE   POCT GLUCOSE   POCT GLUCOSE   POCT GLUCOSE   POCT GLUCOSE   POCT GLUCOSE   POCT GLUCOSE   POCT GLUCOSE   POCT GLUCOSE   POCT GLUCOSE   POCT GLUCOSE   POCT GLUCOSE   POCT GLUCOSE   POCT GLUCOSE   POCT GLUCOSE   POCT GLUCOSE   POCT GLUCOSE   POCT GLUCOSE   POCT GLUCOSE   POC BLOOD GAS AND CHEMISTRY   POCT GLUCOSE   POCT GLUCOSE   POCT GLUCOSE   POCT GLUCOSE   POCT GLUCOSE   POCT GLUCOSE       No results found. RECENT VITALS:     Temp: 98.5 °F (36.9 °C),  Pulse: 73, Resp: 15, BP: 110/61, SpO2: 96 %    This patient is a 26 y.o. Female with DKA, out of insulin for 5 days, initial ph 7.5, down to 6.9, boarding in the emergency department.  On insulin drip, admitted to intermed, then to critical care after glucose dropped too quickly, last Ph 7.2 bicarb 12; critical care asked for patient to go back to step down. Likely issues with lack of high enough glucose concentration drip. OUTSTANDING TASKS / RECOMMENDATIONS:    1. Awaiting bed and shuffling services,  2. Monitor glucose and defer care to admitting services. FINAL IMPRESSION:     1. Type 1 diabetes mellitus with ketoacidosis without coma ([facility] [address]        DISPOSITION:         DISPOSITION:  []  Discharge   []  Transfer -    [x]  Admission -     []  Against Medical Advice   []  Eloped   FOLLOW-UP: No follow-up provider specified.    DISCHARGE MEDICATIONS: New Prescriptions    No medications on file           Mariana Lugo MD  Emergency Medicine Resident  [address] [facility]       Mariana Lugo MD  Resident  06/29/20 9559

## 2020-06-29 NOTE — ED NOTES
Pt wakes to painful stimuli, Dr Christian Olguin at the bedside, dextrose and insulin infusion continued, waiting for bed placement on floor     [address] [place] [address], RN  06/29/20 2313

## 2020-06-29 NOTE — ED NOTES
Bed: 21  Expected date:   Expected time:   Means of arrival:   Comments:  Aida Mayer RN  06/28/20 3021

## 2020-06-29 NOTE — ED PROVIDER NOTES
[facility] ED  Emergency Department  Faculty Sign-Out Addendum     Care of Shamar Christianson was assumed from previous attending and is being seen for Hyperglycemia  . The patient's initial evaluation and plan have been discussed with the prior provider who initially evaluated the patient. Handoff taken on the following patient from prior Attending Physician:    Jesus Carroll    I was available and discussed any additional care issues that arose and coordinated the management plans with the resident(s) caring for the patient during my duty period. Any areas of disagreement with residents documentation of care or procedures are noted on the chart. I was personally present for the key portions of any/all procedures during my duty period. I have documented in the chart those procedures where I was not present during the key portions.       EMERGENCY DEPARTMENT COURSE / MEDICAL DECISION MAKING:       MEDICATIONS GIVEN:  Orders Placed This Encounter   Medications    ondansetron (ZOFRAN) injection 4 mg    ketorolac (TORADOL) injection 15 mg    0.9 % sodium chloride bolus    0.9 % sodium chloride bolus       LABS / RADIOLOGY:     Labs Reviewed   CBC WITH AUTO DIFFERENTIAL - Abnormal; Notable for the following components:       Result Value    WBC 12.9 (*)     RBC 5.72 (*)     Hemoglobin 16.4 (*)     Hematocrit 53.2 (*)     RDW 16.2 (*)     Seg Neutrophils 70 (*)     Lymphocytes 21 (*)     Eosinophils % 0 (*)     Immature Granulocytes 2 (*)     Segs Absolute 9.11 (*)     All other components within normal limits   OSMOLALITY - Abnormal; Notable for the following components:    Serum Osmolality 327 (*)     All other components within normal limits   HGB/HCT - Abnormal; Notable for the following components:    POC Hemoglobin 18.8 (*)     POC Hematocrit 55 (*)     All other components within normal limits   SODIUM (POC) - Abnormal; Notable for the following components:    POC Sodium 133 (*)     All other components within normal limits   POTASSIUM (POC) - Abnormal; Notable for the following components:    POC Potassium 6.9 (*)     All other components within normal limits   CHLORIDE (POC) - Abnormal; Notable for the following components:    POC Chloride 115 (*)     All other components within normal limits   CALCIUM, IONIC (POC) - Abnormal; Notable for the following components:    POC Ionized Calcium 1.06 (*)     All other components within normal limits   POC GLUCOSE FINGERSTICK - Abnormal; Notable for the following components:    POC Glucose 420 (*)     All other components within normal limits   VENOUS BLOOD GAS, POINT OF CARE - Abnormal; Notable for the following components:    pH, [place] 7.125 (*)     pCO2, [place] 14.7 (*)     pO2, [place] 81.4 (*)     HCO3, Venous 4.8 (*)     Total CO2, Venous 5 (*)     Negative Base Excess, [place] 22 (*)     O2 Sat, [place] 92 (*)     All other components within normal limits   LACTIC ACID,POINT OF CARE - Abnormal; Notable for the following components:    POC Lactic Acid 1.96 (*)     All other components within normal limits   POCT GLUCOSE - Abnormal; Notable for the following components:    POC Glucose 449 (*)     All other components within normal limits   HCG, SERUM, QUALITATIVE   URINALYSIS   COMPREHENSIVE METABOLIC PANEL   MAGNESIUM   POC BLOOD GAS AND CHEMISTRY   CREATININE W/GFR POINT OF CARE   ANION GAP (CALC) POC       No results found. RECENT VITALS:     Temp: 98.5 °F (36.9 °C),  Pulse: 98, Resp: 20, BP: 130/78, SpO2: 99 %    This patient is a 26 y.o. Female with out of insulin meds for a few days. DKA. OUTSTANDING TASKS / RECOMMENDATIONS:    1. Admission  2.  Fluid resucitation      Charan Montano MD   Attending Emergency Physician  [address] [place], [place]  06/28/20 4319

## 2020-06-29 NOTE — ED PROVIDER NOTES
[facility]eta Moe  ED  Emergency Department  Faculty Sign-Out Addendum     Care of Abdelrahman Nielsen was assumed from previous attending and is being seen for Hyperglycemia  . The patient's initial evaluation and plan have been discussed with the prior provider who initially evaluated the patient.       EMERGENCY DEPARTMENT COURSE / MEDICAL DECISION MAKING:       MEDICATIONS GIVEN:  Orders Placed This Encounter   Medications    ondansetron (ZOFRAN) injection 4 mg    ketorolac (TORADOL) injection 15 mg    0.9 % sodium chloride bolus    0.9 % sodium chloride bolus    DISCONTD: insulin regular (HUMULIN R;NOVOLIN R) 100 Units in sodium chloride 0.9 % 100 mL infusion    glucose (GLUTOSE) 40 % oral gel 15 g    dextrose 50 % IV solution    glucagon (rDNA) injection 1 mg    dextrose 5 % solution    0.9 % sodium chloride bolus    glucose (GLUTOSE) 40 % oral gel 15 g    dextrose 50 % IV solution    glucagon (rDNA) injection 1 mg    dextrose 5 % solution    insulin regular (HUMULIN R;NOVOLIN R) 100 Units in sodium chloride 0.9 % 100 mL infusion    FOLLOWED BY Linked Order Group     0.9 % sodium chloride bolus     0.45 % sodium chloride infusion    dextrose 5 % and 0.45 % sodium chloride infusion    enoxaparin (LOVENOX) injection 40 mg    dextrose 50 % IV solution    potassium chloride 10 mEq/100 mL IVPB (Peripheral Line)    magnesium sulfate 1 g in dextrose 5% 100 mL IVPB    OR Linked Order Group     sodium phosphate 10 mmol in dextrose 5 % 250 mL IVPB     sodium phosphate 15 mmol in dextrose 5 % 250 mL IVPB     sodium phosphate 20 mmol in dextrose 5 % 500 mL IVPB    ketorolac (TORADOL) injection 30 mg    ondansetron (ZOFRAN) injection 4 mg    promethazine (PHENERGAN) injection 12.5 mg    morphine injection 2 mg    0.9 % sodium chloride bolus    DISCONTD: potassium chloride (KLOR-CON M) extended release tablet 40 mEq    potassium chloride (KLOR-CON M) extended release tablet 40 mEq LABS / RADIOLOGY:     Labs Reviewed   CBC WITH AUTO DIFFERENTIAL - Abnormal; Notable for the following components:       Result Value    WBC 12.9 (*)     RBC 5.72 (*)     Hemoglobin 16.4 (*)     Hematocrit 53.2 (*)     RDW 16.2 (*)     Seg Neutrophils 70 (*)     Lymphocytes 21 (*)     Eosinophils % 0 (*)     Immature Granulocytes 2 (*)     Segs Absolute 9.11 (*)     All other components within normal limits   URINALYSIS - Abnormal; Notable for the following components:    Glucose, Ur 3+ (*)     Ketones, Urine LARGE (*)     Urine Hgb MODERATE (*)     Protein, UA 2+ (*)     All other components within normal limits   OSMOLALITY - Abnormal; Notable for the following components:    Serum Osmolality 327 (*)     All other components within normal limits   HGB/HCT - Abnormal; Notable for the following components:    POC Hemoglobin 18.8 (*)     POC Hematocrit 55 (*)     All other components within normal limits   SODIUM (POC) - Abnormal; Notable for the following components:    POC Sodium 133 (*)     All other components within normal limits   POTASSIUM (POC) - Abnormal; Notable for the following components:    POC Potassium 6.9 (*)     All other components within normal limits   CHLORIDE (POC) - Abnormal; Notable for the following components:    POC Chloride 115 (*)     All other components within normal limits   CALCIUM, IONIC (POC) - Abnormal; Notable for the following components:    POC Ionized Calcium 1.06 (*)     All other components within normal limits   COMPREHENSIVE METABOLIC PANEL - Abnormal; Notable for the following components:    Glucose 288 (*)     CREATININE 1.12 (*)     Calcium 8.4 (*)     CO2 <6 (*)     Alkaline Phosphatase 110 (*)     Total Bilirubin 0.17 (*)     GFR Non- 60 (*)     All other components within normal limits   BETA-HYDROXYBUTYRATE - Abnormal; Notable for the following components:    Beta-Hydroxybutyrate 9.76 (*)     All other components within normal limits   BASIC METABOLIC PANEL - Abnormal; Notable for the following components:    Glucose 270 (*)     BUN 5 (*)     CREATININE 0.99 (*)     Calcium 7.8 (*)     CO2 <6 (*)     All other components within normal limits   BETA-HYDROXYBUTYRATE - Abnormal; Notable for the following components:    Beta-Hydroxybutyrate 9.32 (*)     All other components within normal limits   HGB/HCT - Abnormal; Notable for the following components:    POC Hemoglobin 17.2 (*)     POC Hematocrit 51 (*)     All other components within normal limits   POTASSIUM (POC) - Abnormal; Notable for the following components:    POC Potassium 4.8 (*)     All other components within normal limits   CHLORIDE (POC) - Abnormal; Notable for the following components:    POC Chloride 120 (*)     All other components within normal limits   POC GLUCOSE FINGERSTICK - Abnormal; Notable for the following components:    POC Glucose 420 (*)     All other components within normal limits   VENOUS BLOOD GAS, POINT OF CARE - Abnormal; Notable for the following components:    pH, [place] 7.125 (*)     pCO2, [place] 14.7 (*)     pO2, [place] 81.4 (*)     HCO3, Venous 4.8 (*)     Total CO2, Venous 5 (*)     Negative Base Excess, [place] 22 (*)     O2 Sat, [place] 92 (*)     All other components within normal limits   LACTIC ACID,POINT OF CARE - Abnormal; Notable for the following components:    POC Lactic Acid 1.96 (*)     All other components within normal limits   POCT GLUCOSE - Abnormal; Notable for the following components:    POC Glucose 449 (*)     All other components within normal limits   POC GLUCOSE FINGERSTICK - Abnormal; Notable for the following components:    POC Glucose 417 (*)     All other components within normal limits   POC GLUCOSE FINGERSTICK - Abnormal; Notable for the following components:    POC Glucose 370 (*)     All other components within normal limits   POC GLUCOSE FINGERSTICK - Abnormal; Notable for the following components:    POC Glucose 220 (*)     All other components within normal limits   POC GLUCOSE FINGERSTICK - Abnormal; Notable for the following components:    POC Glucose 230 (*)     All other components within normal limits   VENOUS BLOOD GAS, POINT OF CARE - Abnormal; Notable for the following components:    pH, [place] 6.981 (*)     pCO2, [place] 21.7 (*)     pO2, [place] 57.6 (*)     HCO3, Venous 5.1 (*)     Total CO2, Venous 6 (*)     Negative Base Excess, [place] 25 (*)     All other components within normal limits   POCT GLUCOSE - Abnormal; Notable for the following components:    POC Glucose 277 (*)     All other components within normal limits   POC GLUCOSE FINGERSTICK - Abnormal; Notable for the following components:    POC Glucose 253 (*)     All other components within normal limits   POCT GLUCOSE - Normal   POCT GLUCOSE - Normal   POCT GLUCOSE - Normal   HCG, SERUM, QUALITATIVE   SPECIMEN REJECTION   SPECIMEN REJECTION   MICROSCOPIC URINALYSIS   MAGNESIUM   PHOSPHORUS   SODIUM (POC)   CALCIUM, IONIC (POC)   PREVIOUS SPECIMEN   HEMOGLOBIN A1C   PREVIOUS SPECIMEN   PREVIOUS SPECIMEN   BASIC METABOLIC PANEL   BASIC METABOLIC PANEL   MAGNESIUM   MAGNESIUM   PHOSPHORUS   PHOSPHORUS   BASIC METABOLIC PANEL   MAGNESIUM   PHOSPHORUS   POC BLOOD GAS AND CHEMISTRY   CREATININE W/GFR POINT OF CARE   ANION GAP (CALC) POC   POCT GLUCOSE   POCT GLUCOSE   POCT GLUCOSE   POCT GLUCOSE   POC BLOOD GAS   CREATININE W/GFR POINT OF CARE   LACTIC ACID,POINT OF CARE   ANION GAP (CALC) POC   POCT GLUCOSE   POCT GLUCOSE   POCT GLUCOSE   POCT GLUCOSE   POCT GLUCOSE   POCT GLUCOSE   POCT GLUCOSE   POCT GLUCOSE   POCT GLUCOSE   POCT GLUCOSE   POCT GLUCOSE   POCT GLUCOSE   POCT GLUCOSE   POCT GLUCOSE   POCT GLUCOSE   POCT GLUCOSE   POCT GLUCOSE   POCT GLUCOSE   POCT GLUCOSE   POCT GLUCOSE   POCT GLUCOSE   POCT GLUCOSE   POCT GLUCOSE   POCT GLUCOSE   POCT GLUCOSE   POCT GLUCOSE   POCT GLUCOSE   POCT GLUCOSE   POCT GLUCOSE   POCT GLUCOSE   POCT GLUCOSE   POCT GLUCOSE   POCT GLUCOSE   POCT GLUCOSE   POCT GLUCOSE   POCT GLUCOSE   POCT GLUCOSE   POCT GLUCOSE   POCT GLUCOSE   POCT GLUCOSE   POCT GLUCOSE   POCT GLUCOSE   POCT GLUCOSE   POCT GLUCOSE   POCT GLUCOSE   POCT GLUCOSE   POCT GLUCOSE   POCT GLUCOSE   POCT GLUCOSE   POCT GLUCOSE   POCT GLUCOSE   POCT GLUCOSE       No results found. RECENT VITALS:     Temp: 98.5 °F (36.9 °C),  Pulse: 97, Resp: 19, BP: (!) 146/84, SpO2: 98 %    This patient is a 26 y.o. Female with abdominal pain nausea vomiting, found to be in DKA, initial bicarbonate less than 6. Receiving IV fluids and on insulin drip. Insulin drip may have been discontinued sometime through the night for uncertain reason. Repeat chemistries show persistent/worsening DKA with increased beta hydroxybutyrate, pH 6.9, bicarbonate 6. Will increase insulin drip to 10 units/h and repeat chemistries on a regular basis. OUTSTANDING TASKS / RECOMMENDATIONS:    1. Monitor  2. Insulin drip  3. Reassess  4. Repeat chemistries      Alcides Brower. Julita Jimenes MD, Olivia Manjarrez  Attending Emergency Physician  [address] ED        Marie Wilson MD  06/29/20 4413       Marie Wilson MD  06/29/20 8189    Patient has persistent dyspnea and work of breathing. No change in her pH, 6.9, bicarbonate 6. Seen by hospitalist, recommend admission to ICU.        Marie Wilson MD  06/29/20 3046

## 2020-06-29 NOTE — ED PROVIDER NOTES
FACULTY SIGN-OUT  ADDENDUM     Care of this patient was assumed from previous attending physician. The patient's initial evaluation and plan have been discussed with the prior provider who initially evaluated the patient. Attestation  I was available and discussed any additional care issues that arose and coordinated the management plans with the resident(s) caring for the patient during my duty period. Any areas of disagreement with resident's documentation of care or procedures are noted on the chart. I was personally present for the key portions of any/all procedures, during my duty period. I have documented in the chart those procedures where I was not present during the key portions.        ED COURSE      The patient was given the following medications:  Orders Placed This Encounter   Medications    ondansetron (ZOFRAN) injection 4 mg    ketorolac (TORADOL) injection 15 mg    0.9 % sodium chloride bolus    0.9 % sodium chloride bolus    DISCONTD: insulin regular (HUMULIN R;NOVOLIN R) 100 Units in sodium chloride 0.9 % 100 mL infusion    glucose (GLUTOSE) 40 % oral gel 15 g    dextrose 50 % IV solution    glucagon (rDNA) injection 1 mg    dextrose 5 % solution    0.9 % sodium chloride bolus    glucose (GLUTOSE) 40 % oral gel 15 g    dextrose 50 % IV solution    glucagon (rDNA) injection 1 mg    dextrose 5 % solution    insulin regular (HUMULIN R;NOVOLIN R) 100 Units in sodium chloride 0.9 % 100 mL infusion    FOLLOWED BY Linked Order Group     0.9 % sodium chloride bolus     0.45 % sodium chloride infusion    dextrose 5 % and 0.45 % sodium chloride infusion    enoxaparin (LOVENOX) injection 40 mg    dextrose 50 % IV solution    potassium chloride 10 mEq/100 mL IVPB (Peripheral Line)    magnesium sulfate 1 g in dextrose 5% 100 mL IVPB    OR Linked Order Group     sodium phosphate 10 mmol in dextrose 5 % 250 mL IVPB     sodium phosphate 15 mmol in dextrose 5 % 250 mL IVPB    

## 2020-06-29 NOTE — ED NOTES
Pt assisted with cleaning herself up. Provided with wash cloths, soap, toothbrush and tooth paste. Will assist pt back into bed shortly.  Pt edu she is NPO per admitting team at this time     Angi David RN  06/29/20 2628

## 2020-06-29 NOTE — ED NOTES
Pt resting in stretcher, awakens with painful stimuli, insulin dose adjusted to BS level, will continue to moniot.  Awaiting bed placement     Micheline Brambila RN  06/29/20 1928

## 2020-06-29 NOTE — ED NOTES
Repeat glucose obtained, insulin drip titrated as instructed, waiting for bed placement on floor, pt wakes to verbal stimuli     Heather Jackson RN  06/29/20 8226

## 2020-06-29 NOTE — ED NOTES
Bed: 37  Expected date:   Expected time:   Means of arrival:   Comments:     Viral Dueñas RN  06/29/20 5002

## 2020-06-29 NOTE — ED PROVIDER NOTES
Critical Care - History and Physical Examination    Patient's name:  Amalia Mar  Medical Record Number: [id number]  Patient's account/billing number: [de-identified]  Patient's YOB: 1995  Age: 26 y. o. Date of Admission: 6/28/2020 10:30 PM  Reason of ICU admission:   Date of History and Physical Examination: 6/29/2020      Primary Care Physician: No primary care provider on file. Attending Physician:    Code Status: Full Code    Chief complaint: nausea, vomiting, generally feeling unwell    Reason for ICU admission:     Diabetic DKA, noncompliant    History Of Present Illness:   History was obtained from chart review and the patient. Amalia Mar is a 26 y.o. female poorly compliant with History and physical. She is alert and oriented x3, speech appropriate, follows commands but gives short one work answers and shrugs during questions. From out of town, IDDM type 1, not been taking insulin for at least 2 days. Reports feeling unwell with nausea, vomiting general malaise for last 2 days. Denies fever, sob, sick contacts, headache, chest pain, blurry vision. Takes insulin, denies other medical history or prescriptions. Denies smoking, alcohol, street drugs. Past Medical History:  No past medical history on file. type 1 IDDM    Past Surgical History:  No past surgical history on file. denies    Allergies:    No Known Allergies      Home Medications:   Prior to Admission medications    Medication Sig Start Date End Date Taking? Authorizing Provider   insulin regular (HUMULIN R;NOVOLIN R) 100 UNIT/ML injection Inject into the skin See Admin Instructions   Yes Historical Provider, MD   insulin NPH (NOVOLIN N) 100 UNIT/ML injection vial Inject into the skin 2 times daily (before meals)   Yes Historical Provider, MD       Social History:   TOBACCO:   has no history on file for tobacco. denies  ETOH:   has no history on file for alcohol. desnies  DRUGS:  has no history on file for drug. saud  OCCUPATION:      Family History:   No family history on file. REVIEW OF SYSTEMS (ROS):  Review of Systems - Negative except mentioned in HPI   General ROS: 2 days general malaise  Psychological ROS: negative  Ophthalmic ROS: negative  ENT: negative  Hematological and Lymphatic ROS: negative  Endocrine ROS: IDDM type 1. Breast ROS: negative  Respiratory ROS: no cough, shortness of breath, or wheezing  Cardiovascular ROS: no chest pain or dyspnea on exertion  Gastrointestinal ROS: 2 days non bloody emesis  Genito-Urinary ROS: negative  Musculoskeletal ROS: negative  Neurological ROS: negative  Dermatological ROS: negative      Physical Exam:    Vitals: /61   Pulse 73   Temp 98.5 °F (36.9 °C) (Axillary)   Resp 15   Ht 5' 7\" (1.702 m)   Wt 210 lb (95.3 kg)   SpO2 96%   BMI 32.89 kg/m²     Body weight:   Wt Readings from Last 3 Encounters:   06/28/20 210 lb (95.3 kg)       Body Mass Index : Body mass index is 32.89 kg/m². PHYSICAL EXAMINATION :  Constitutional: Appears well, in no distress  EENT: PERRLA, EOMI, sclera clear, anicteric, oropharynx clear, no lesions, neck supple with midline trachea. Neck: Supple, symmetrical, trachea midline, no adenopathy, thyroid symmetric, no jvd skin normal  Respiratory: clear to auscultation, no wheezes or rales and unlabored breathing.  No intercostal tenderness  Cardiovascular: regular rate and rhythm, normal S1, S2, no murmur noted and 2+ pulses throughout  Abdomen: soft, nontender, nondistended, no masses or organomegaly  Neurological:  Extremities:  peripheral pulses normal, no pedal edema, no clubbing or cyanosis      Laboratory findings:-    CBC:   Recent Labs     06/28/20  2312   WBC 12.9*   HGB 16.4*        BMP:    Recent Labs     06/29/20  0902  06/29/20  1355 06/29/20  1432  06/29/20  1819     --  135  --   --   --    K 4.3  --  5.8*  --   --   --    *  --  108*  --   --   --    CO2 <6*  --  8*  --   --   --    BUN 4*  --  3*  --   --   --    CREATININE 1.04*  --  0.91* 0.50*  --   --    GLUCOSE 274*   < > 202*  --    < > 136    < > = values in this interval not displayed. S. Calcium:  Recent Labs     06/29/20  1355   CALCIUM 8.5*     S. Ionized Calcium:No results for input(s): IONCA in the last 72 hours. S. Magnesium:  Recent Labs     06/29/20  1355   MG 2.2     S. Phosphorus:  Recent Labs     06/29/20  1355   PHOS 1.2*     S. Glucose:  Recent Labs     06/29/20  1517 06/29/20  1620 06/29/20  1724   POCGLU 168* 164* 142*     Glycosylated hemoglobin A1C:   Recent Labs     06/29/20  0016   LABA1C 11.7*     INR: No results for input(s): INR in the last 72 hours. Hepatic functions:   Recent Labs     06/29/20  0321   ALKPHOS 110*   ALT 18   AST 30   PROT 8.0   BILITOT 0.17*   LABALBU 4.2     Pancreatic functions:No results for input(s): LACTA, AMYLASE in the last 72 hours. S. Lactic Acid: No results for input(s): LACTA in the last 72 hours. Cardiac enzymes:No results for input(s): CKTOTAL, CKMB, CKMBINDEX, TROPONINI in the last 72 hours. BNP:No results for input(s): BNP in the last 72 hours. Lipid profile: No results for input(s): CHOL, TRIG, HDL, LDLCALC in the last 72 hours.     Invalid input(s): LDL  Blood Gases: No results found for: PH, PCO2, PO2, HCO3, O2SAT  Thyroid functions: No results found for: TSH     Urinalysis:     Microbiology:  Cultures during this admission:     Blood cultures:                 [x] None drawn      [] Negative             []  Positive (Details:  )  Urine Culture:                   [x] None drawn      [] Negative             []  Positive (Details:  )  Sputum Culture:               [x] None drawn       [] Negative             []  Positive (Details:  )   Endotracheal aspirate:     [x] None drawn       [] Negative             []  Positive (Details:  )         -----------------------------------------------------------------    Electrocardiogram:     Axis normal, intervals normal, sate and rhythm normal. Not acute st changes             Assessment and Plan     Patient Active Problem List   Diagnosis    Diabetic ketoacidosis without coma associated with type 1 diabetes mellitus ([facility]    GHAZALA (acute kidney injury) ([facility] [facility]    Noncompliance         Additional assessment:  · Patient appears clinically well, but tired. Breathing is 15/min unlabored 96% sa02 on room air. Mentation at baseline, vital signs stable. Plan:  · Continue ED management with insulin, IVF resuscitation, BMP and electrolytes, glucose checks  · Replace electrolytes as needed  · Recommend admission to stepdown service given improvement in gases and mentation.             Chavo Burgos MD PGY1  [address], [place]  [phone]                6/29/2020, 6:25 PM         Chavo Burgos MD  Resident  06/29/20 9269

## 2020-06-29 NOTE — ED NOTES
Pt resting on cot respirations even and unlabored, repeat glucose drawn, insulin and dextrose continue to infuse, waiting for bed placement on floor     [address] [place] [address], RN  06/29/20 4487

## 2020-06-29 NOTE — ED PROVIDER NOTES
[facility] ED  Emergency Department  Emergency Medicine Resident Sign-out     Care of Dipti Miller was assumed from Dr. Elicia Mistry and is being seen for Hyperglycemia   . The patient's initial evaluation and plan have been discussed with the prior provider who initially evaluated the patient.      EMERGENCY DEPARTMENT COURSE / MEDICAL DECISION MAKING:       MEDICATIONS GIVEN:  Orders Placed This Encounter   Medications    ondansetron (ZOFRAN) injection 4 mg    ketorolac (TORADOL) injection 15 mg    0.9 % sodium chloride bolus    0.9 % sodium chloride bolus    DISCONTD: insulin regular (HUMULIN R;NOVOLIN R) 100 Units in sodium chloride 0.9 % 100 mL infusion    glucose (GLUTOSE) 40 % oral gel 15 g    dextrose 50 % IV solution    glucagon (rDNA) injection 1 mg    dextrose 5 % solution    0.9 % sodium chloride bolus    glucose (GLUTOSE) 40 % oral gel 15 g    dextrose 50 % IV solution    glucagon (rDNA) injection 1 mg    dextrose 5 % solution    insulin regular (HUMULIN R;NOVOLIN R) 100 Units in sodium chloride 0.9 % 100 mL infusion    FOLLOWED BY Linked Order Group     0.9 % sodium chloride bolus     0.45 % sodium chloride infusion    dextrose 5 % and 0.45 % sodium chloride infusion    enoxaparin (LOVENOX) injection 40 mg    dextrose 50 % IV solution    potassium chloride 10 mEq/100 mL IVPB (Peripheral Line)    magnesium sulfate 1 g in dextrose 5% 100 mL IVPB    OR Linked Order Group     sodium phosphate 10 mmol in dextrose 5 % 250 mL IVPB     sodium phosphate 15 mmol in dextrose 5 % 250 mL IVPB     sodium phosphate 20 mmol in dextrose 5 % 500 mL IVPB    ketorolac (TORADOL) injection 30 mg    ondansetron (ZOFRAN) injection 4 mg    promethazine (PHENERGAN) injection 12.5 mg    morphine injection 2 mg    0.9 % sodium chloride bolus    potassium chloride (KLOR-CON M) extended release tablet 40 mEq       LABS / RADIOLOGY:     Labs Reviewed   CBC WITH AUTO DIFFERENTIAL - Abnormal; Notable for the following components:       Result Value    WBC 12.9 (*)     RBC 5.72 (*)     Hemoglobin 16.4 (*)     Hematocrit 53.2 (*)     RDW 16.2 (*)     Seg Neutrophils 70 (*)     Lymphocytes 21 (*)     Eosinophils % 0 (*)     Immature Granulocytes 2 (*)     Segs Absolute 9.11 (*)     All other components within normal limits   URINALYSIS - Abnormal; Notable for the following components:    Glucose, Ur 3+ (*)     Ketones, Urine LARGE (*)     Urine Hgb MODERATE (*)     Protein, UA 2+ (*)     All other components within normal limits   OSMOLALITY - Abnormal; Notable for the following components:    Serum Osmolality 327 (*)     All other components within normal limits   HGB/HCT - Abnormal; Notable for the following components:    POC Hemoglobin 18.8 (*)     POC Hematocrit 55 (*)     All other components within normal limits   SODIUM (POC) - Abnormal; Notable for the following components:    POC Sodium 133 (*)     All other components within normal limits   POTASSIUM (POC) - Abnormal; Notable for the following components:    POC Potassium 6.9 (*)     All other components within normal limits   CHLORIDE (POC) - Abnormal; Notable for the following components:    POC Chloride 115 (*)     All other components within normal limits   CALCIUM, IONIC (POC) - Abnormal; Notable for the following components:    POC Ionized Calcium 1.06 (*)     All other components within normal limits   COMPREHENSIVE METABOLIC PANEL - Abnormal; Notable for the following components:    Glucose 288 (*)     CREATININE 1.12 (*)     Calcium 8.4 (*)     CO2 <6 (*)     Alkaline Phosphatase 110 (*)     Total Bilirubin 0.17 (*)     GFR Non- 60 (*)     All other components within normal limits   POC GLUCOSE FINGERSTICK - Abnormal; Notable for the following components:    POC Glucose 420 (*)     All other components within normal limits   VENOUS BLOOD GAS, POINT OF CARE - Abnormal; Notable for the following components: pH, Frans[place] 7.125 (*)     pCO2, [place] 14.7 (*)     pO2, [place] 81.4 (*)     HCO3, Venous 4.8 (*)     Total CO2, Venous 5 (*)     Negative Base Excess, [place] 22 (*)     O2 Sat, [place] 92 (*)     All other components within normal limits   LACTIC ACID,POINT OF CARE - Abnormal; Notable for the following components:    POC Lactic Acid 1.96 (*)     All other components within normal limits   POCT GLUCOSE - Abnormal; Notable for the following components:    POC Glucose 449 (*)     All other components within normal limits   POC GLUCOSE FINGERSTICK - Abnormal; Notable for the following components:    POC Glucose 417 (*)     All other components within normal limits   POC GLUCOSE FINGERSTICK - Abnormal; Notable for the following components:    POC Glucose 370 (*)     All other components within normal limits   POCT GLUCOSE - Normal   HCG, SERUM, QUALITATIVE   SPECIMEN REJECTION   SPECIMEN REJECTION   MICROSCOPIC URINALYSIS   MAGNESIUM   PHOSPHORUS   PREVIOUS SPECIMEN   HEMOGLOBIN A1C   PREVIOUS SPECIMEN   PREVIOUS SPECIMEN   BASIC METABOLIC PANEL   BASIC METABOLIC PANEL   MAGNESIUM   MAGNESIUM   PHOSPHORUS   PHOSPHORUS   BETA-HYDROXYBUTYRATE   POC BLOOD GAS AND CHEMISTRY   CREATININE W/GFR POINT OF CARE   ANION GAP (CALC) POC   POCT GLUCOSE   POCT GLUCOSE   POCT GLUCOSE   POCT GLUCOSE   POCT GLUCOSE   POCT GLUCOSE   POCT GLUCOSE   POCT GLUCOSE   POCT GLUCOSE   POCT GLUCOSE   POCT GLUCOSE   POCT GLUCOSE   POCT GLUCOSE   POCT GLUCOSE   POCT GLUCOSE   POCT GLUCOSE   POCT GLUCOSE   POCT GLUCOSE   POCT GLUCOSE   POCT GLUCOSE   POCT GLUCOSE   POCT GLUCOSE   POCT GLUCOSE   POCT GLUCOSE   POCT GLUCOSE   POCT GLUCOSE   POCT GLUCOSE   POCT GLUCOSE   POCT GLUCOSE   POCT GLUCOSE   POCT GLUCOSE   POCT GLUCOSE   POCT GLUCOSE   POCT GLUCOSE   POCT GLUCOSE   POCT GLUCOSE   POCT GLUCOSE   POCT GLUCOSE   POCT GLUCOSE   POCT GLUCOSE   POCT GLUCOSE   POCT GLUCOSE   POCT GLUCOSE   POCT GLUCOSE   POCT GLUCOSE   POCT GLUCOSE       No orders to display RECENT VITALS:     Temp: 98.5 °F (36.9 °C),  Pulse: 96, Resp: 20, BP: 130/78, SpO2: 99 %    This patient is a 26 y.o. Female with diabetic ketoacidosis. Patient is in town from [place], has been without her insulin for approximately 3 to 4 days. Patient's blood sugar was noted to be 450 upon initial blood draw. Patient's pH was 7.15, patient has an elevated serum osmolality. Patient's potassium was noted to be 5.0, given oral potassium supplementation as well as potassium supplementation and maintenance fluids. Patient is admitted to intermed. Patient will continue of IV fluid resuscitation, is currently finishing her third liter of normal saline. Reassess, await bed placement    Patient's repeat VBG shows decreasing pH, serum ketones relatively unchanged, patient's insulin was increased to 7 units an hour per protocol. Upon reevaluation of patient easily arousable denies headache, requesting something to eat or drink. Signed out to Dr. Edawrd Melara bed placement      OUTSTANDING TASKS / RECOMMENDATIONS:      1. Await Bed placement       FINAL IMPRESSION:     1. Type 1 diabetes mellitus with ketoacidosis without coma ([facility] [address]        DISPOSITION:       DISPOSITION:  []  Discharge   []  Transfer -    [x]  Admission - intermed    []  Against Medical Advice   []  Eloped   FOLLOW-UP: No follow-up provider specified.    DISCHARGE MEDICATIONS: New Prescriptions    No medications on file          Husam Montelongo DO  Emergency Medicine Resident  [address] [place] [address]  Resident  06/29/20 6757

## 2020-06-29 NOTE — ED NOTES
Per Dr Ulysses Shay pt no longer needs to go to the ICU and can go to step down     Perico Terrazas RN  06/29/20 4287

## 2020-06-29 NOTE — ED PROVIDER NOTES
[facility]  ED  Emergency Department Encounter  Emergency Medicine Resident     Pt Name: Rodney Putnam  MRN: [id number]  Benjamíngfgonzalez 1995  Date of evaluation: 6/28/20  PCP:  No primary care provider on file. CHIEF COMPLAINT       Chief Complaint   Patient presents with    Hyperglycemia       HISTORY OFPRESENT ILLNESS  (Location/Symptom, Timing/Onset, Context/Setting, Quality, Duration, Modifying Factors,Severity.)      Rodney Putnam is a 26year old female who presents with concerns for diabetic ketoacidosis. Patient is in town from [facility], does not have an extensive chart history through our system. Patient states that she has insulin-dependent diabetes but that she has been without her insulin for approximately 2 to 3 days. Patient does have a history of previous he has hospital admission requiring intravenous insulin for management of hyperglycemia. Patient states that she began feeling under the weather over the past 6 to 7 hours, complaining of nausea with vomiting, as well as generalized abdominal pain. Patient denies sick contacts, denies fever, runny nose, shortness of breath, cough, denies change in bowel or bladder function, patient states that she does not believe that she is pregnant    PAST MEDICAL / SURGICAL / SOCIAL / FAMILY HISTORY      has no past medical history on file. has no past surgical history on file.      Social History     Socioeconomic History    Marital status: Single     Spouse name: Not on file    Number of children: Not on file    Years of education: Not on file    Highest education level: Not on file   Occupational History    Not on file   Social Needs    Financial resource strain: Not on file    Food insecurity     Worry: Not on file     Inability: Not on file    Transportation needs     Medical: Not on file     Non-medical: Not on file   Tobacco Use    Smoking status: Not on file   Substance and Sexual Activity    Alcohol bleeding and vaginal discharge. Musculoskeletal: Negative for arthralgias, back pain, gait problem and neck pain. Neurological: Positive for headaches. Negative for dizziness, tremors, seizures, syncope, facial asymmetry and numbness. Psychiatric/Behavioral: Negative for self-injury and suicidal ideas. PHYSICAL EXAM   (up to 7 for level 4, 8 or more forlevel 5)      INITIAL VITALS:   ED Triage Vitals   BP Temp Temp src Pulse Resp SpO2 Height Weight   -- -- -- -- -- -- -- --       Physical Exam  Constitutional:       General: She is not in acute distress. Appearance: She is obese. She is not toxic-appearing or diaphoretic. HENT:      Head: Normocephalic and atraumatic. Eyes:      Extraocular Movements: Extraocular movements intact. Neck:      Musculoskeletal: No neck rigidity or muscular tenderness. Cardiovascular:      Rate and Rhythm: Normal rate and regular rhythm. Pulses: Normal pulses. Pulmonary:      Effort: No respiratory distress. Breath sounds: Normal breath sounds. No wheezing. Abdominal:      General: There is no distension. Palpations: Abdomen is soft. Tenderness: There is no abdominal tenderness. Musculoskeletal: Normal range of motion. Skin:     General: Skin is dry. Neurological:      General: No focal deficit present. Mental Status: She is oriented to person, place, and time. Psychiatric:         Mood and Affect: Mood normal.         Behavior: Behavior normal.         Thought Content:  Thought content normal.         Judgment: Judgment normal.         DIFFERENTIAL  DIAGNOSIS     PLAN (LABS / IMAGING / EKG):  Orders Placed This Encounter   Procedures    CBC Auto Differential    Urinalysis    Osmolality    HCG Qualitative, Serum    Hemoglobin and hematocrit, blood    SODIUM (POC)    POTASSIUM (POC)    CHLORIDE (POC)    CALCIUM, IONIC (POC)    SPECIMEN REJECTION    PREVIOUS SPECIMEN    Hemoglobin A1C    Basic Metabolic Panel    50 % IV solution    glucagon (rDNA) injection 1 mg    dextrose 5 % solution    0.9 % sodium chloride bolus    glucose (GLUTOSE) 40 % oral gel 15 g    dextrose 50 % IV solution    glucagon (rDNA) injection 1 mg    dextrose 5 % solution    insulin regular (HUMULIN R;NOVOLIN R) 100 Units in sodium chloride 0.9 % 100 mL infusion    FOLLOWED BY Linked Order Group     0.9 % sodium chloride bolus     0.45 % sodium chloride infusion    dextrose 5 % and 0.45 % sodium chloride infusion    enoxaparin (LOVENOX) injection 40 mg    dextrose 50 % IV solution    potassium chloride 10 mEq/100 mL IVPB (Peripheral Line)    magnesium sulfate 1 g in dextrose 5% 100 mL IVPB    OR Linked Order Group     sodium phosphate 10 mmol in dextrose 5 % 250 mL IVPB     sodium phosphate 15 mmol in dextrose 5 % 250 mL IVPB     sodium phosphate 20 mmol in dextrose 5 % 500 mL IVPB    ketorolac (TORADOL) injection 30 mg    ondansetron (ZOFRAN) injection 4 mg    promethazine (PHENERGAN) injection 12.5 mg    morphine injection 2 mg       DDX: Diabetic ketoacidosis, HHS, fever, sepsis, hyperglycemia    Initial MDM/Plan: 26 y.o. female who presents with concerns for diabetic ketoacidosis. Patient is insulin-dependent, has been without her insulin for the past 4 days, is generalized fatigue, achiness, headache as well as nausea and vomiting, patient also endorses generalized abdominal pain with symptoms worsening over the past 6 to 7 hours.   Plan is to fluid resuscitate with normal saline, DKA work-up initially initiated including venous blood gas,    DIAGNOSTIC RESULTS / EMERGENCYDEPARTMENT COURSE / MDM     LABS:  Labs Reviewed   CBC WITH AUTO DIFFERENTIAL - Abnormal; Notable for the following components:       Result Value    WBC 12.9 (*)     RBC 5.72 (*)     Hemoglobin 16.4 (*)     Hematocrit 53.2 (*)     RDW 16.2 (*)     Seg Neutrophils 70 (*)     Lymphocytes 21 (*)     Eosinophils % 0 (*)     Immature Granulocytes 2 (*)     Segs Absolute 9.11 (*)     All other components within normal limits   URINALYSIS - Abnormal; Notable for the following components:    Glucose, Ur 3+ (*)     Ketones, Urine LARGE (*)     Urine Hgb MODERATE (*)     Protein, UA 2+ (*)     All other components within normal limits   OSMOLALITY - Abnormal; Notable for the following components:    Serum Osmolality 327 (*)     All other components within normal limits   HGB/HCT - Abnormal; Notable for the following components:    POC Hemoglobin 18.8 (*)     POC Hematocrit 55 (*)     All other components within normal limits   SODIUM (POC) - Abnormal; Notable for the following components:    POC Sodium 133 (*)     All other components within normal limits   POTASSIUM (POC) - Abnormal; Notable for the following components:    POC Potassium 6.9 (*)     All other components within normal limits   CHLORIDE (POC) - Abnormal; Notable for the following components:    POC Chloride 115 (*)     All other components within normal limits   CALCIUM, IONIC (POC) - Abnormal; Notable for the following components:    POC Ionized Calcium 1.06 (*)     All other components within normal limits   POC GLUCOSE FINGERSTICK - Abnormal; Notable for the following components:    POC Glucose 420 (*)     All other components within normal limits   VENOUS BLOOD GAS, POINT OF CARE - Abnormal; Notable for the following components:    pH, Frans[place] 7.125 (*)     pCO2, [place] 14.7 (*)     pO2, [place] 81.4 (*)     HCO3, Venous 4.8 (*)     Total CO2, Venous 5 (*)     Negative Base Excess, [place] 22 (*)     O2 Sat, [place] 92 (*)     All other components within normal limits   LACTIC ACID,POINT OF CARE - Abnormal; Notable for the following components:    POC Lactic Acid 1.96 (*)     All other components within normal limits   POCT GLUCOSE - Abnormal; Notable for the following components:    POC Glucose 449 (*)     All other components within normal limits   POC GLUCOSE FINGERSTICK - Abnormal; Notable for the following components: POC Glucose 417 (*)     All other components within normal limits   POC GLUCOSE FINGERSTICK - Abnormal; Notable for the following components:    POC Glucose 370 (*)     All other components within normal limits   POCT GLUCOSE - Normal   HCG, SERUM, QUALITATIVE   SPECIMEN REJECTION   SPECIMEN REJECTION   MICROSCOPIC URINALYSIS   PREVIOUS SPECIMEN   HEMOGLOBIN A1C   PREVIOUS SPECIMEN   PREVIOUS SPECIMEN   COMPREHENSIVE METABOLIC PANEL   MAGNESIUM   BASIC METABOLIC PANEL   BASIC METABOLIC PANEL   MAGNESIUM   MAGNESIUM   PHOSPHORUS   PHOSPHORUS   BETA-HYDROXYBUTYRATE   PHOSPHORUS   POC BLOOD GAS AND CHEMISTRY   CREATININE W/GFR POINT OF CARE   ANION GAP (CALC) POC   POCT GLUCOSE   POCT GLUCOSE   POCT GLUCOSE   POCT GLUCOSE   POCT GLUCOSE   POCT GLUCOSE   POCT GLUCOSE   POCT GLUCOSE   POCT GLUCOSE   POCT GLUCOSE   POCT GLUCOSE   POCT GLUCOSE   POCT GLUCOSE   POCT GLUCOSE   POCT GLUCOSE   POCT GLUCOSE   POCT GLUCOSE   POCT GLUCOSE   POCT GLUCOSE   POCT GLUCOSE   POCT GLUCOSE   POCT GLUCOSE   POCT GLUCOSE   POCT GLUCOSE   POCT GLUCOSE   POCT GLUCOSE   POCT GLUCOSE   POCT GLUCOSE   POCT GLUCOSE   POCT GLUCOSE   POCT GLUCOSE   POCT GLUCOSE   POCT GLUCOSE   POCT GLUCOSE   POCT GLUCOSE   POCT GLUCOSE   POCT GLUCOSE   POCT GLUCOSE   POCT GLUCOSE   POCT GLUCOSE   POCT GLUCOSE   POCT GLUCOSE   POCT GLUCOSE   POCT GLUCOSE         RADIOLOGY:  No results found.     EKG  Sinus tachycardia, normal axis, normal sinus, no ST elevation or depressions, concern for biatrial enlargement based on 2 in V1, is not no ST elevation or depression, no T wave inversion    All EKG's are interpreted by the Emergency Department Physicianwho either signs or Co-signs this chart in the absence of a cardiologist.    EMERGENCY DEPARTMENT COURSE:  ED Course as of Jun 29 0328   Sun Jun 28, 2020   7191 Patient started on IV fluid bolus of NS prior to result    POC Glucose(!!): 420 [GP]   3163 Finding consistent with diabetic ketoacidosis, will wait for formal potassium before treating with insulin   pH, Fransisco(!!): 7.125 [GP]   2315 EKG does not show hyperacute T waves, will wait for formal potassium before treating, intervals of EKG are QRS interval of 72, QTc of 478   POC Potassium(!!): 6.9 [GP]      ED Course User Index  [GP] Melanie Gonzalez MD      Patient has 2 peripheral access IVs, receiving 3 L total normal saline, continue to check point-of-care glucoses, we have had some difficulties with 3 different samples for potassium/BMPs clotting prior to evaluation by lab, will continue to attempt to send. Patient is mentating appropriately. Patient care handed off to Dr. Juan Morales:  None    CONSULTS:  IP CONSULT TO HOSPITALIST  IP CONSULT TO DIABETES EDUCATOR  IP CONSULT TO DIETITIAN    CRITICAL CARE:  Please see attending note    FINAL IMPRESSION      1. Type 1 diabetes mellitus with ketoacidosis without coma ([facility] [address]          DISPOSITION / PLAN     DISPOSITION        PATIENT REFERRED TO:  No follow-up provider specified.     DISCHARGE MEDICATIONS:  New Prescriptions    No medications on file       Melanie Gonzalez MD  Emergency Medicine Resident    (Please note that portions of this note were completed with a voice recognition program.Efforts were made to edit the dictations but occasionally words are mis-transcribed.)       Melanie Gonzalez MD  Resident  06/29/20 7753

## 2020-06-30 VITALS
DIASTOLIC BLOOD PRESSURE: 76 MMHG | OXYGEN SATURATION: 100 % | TEMPERATURE: 98.5 F | SYSTOLIC BLOOD PRESSURE: 125 MMHG | WEIGHT: 200.84 LBS | RESPIRATION RATE: 21 BRPM | BODY MASS INDEX: 31.52 KG/M2 | HEIGHT: 67 IN | HEART RATE: 95 BPM

## 2020-06-30 PROBLEM — E86.0 DEHYDRATION: Status: ACTIVE | Noted: 2020-06-30

## 2020-06-30 PROBLEM — E66.9 CLASS 1 OBESITY WITHOUT SERIOUS COMORBIDITY WITH BODY MASS INDEX (BMI) OF 31.0 TO 31.9 IN ADULT: Status: ACTIVE | Noted: 2020-06-30

## 2020-06-30 PROBLEM — E87.8 ELECTROLYTE IMBALANCE: Status: ACTIVE | Noted: 2020-06-30

## 2020-06-30 LAB
ANION GAP SERPL CALCULATED.3IONS-SCNC: 13 MMOL/L (ref 9–17)
ANION GAP SERPL CALCULATED.3IONS-SCNC: 14 MMOL/L (ref 9–17)
ANION GAP SERPL CALCULATED.3IONS-SCNC: 15 MMOL/L (ref 9–17)
BUN BLDV-MCNC: 3 MG/DL (ref 6–20)
BUN/CREAT BLD: ABNORMAL (ref 9–20)
CALCIUM SERPL-MCNC: 8.3 MG/DL (ref 8.6–10.4)
CALCIUM SERPL-MCNC: 8.5 MG/DL (ref 8.6–10.4)
CALCIUM SERPL-MCNC: 8.7 MG/DL (ref 8.6–10.4)
CHLORIDE BLD-SCNC: 108 MMOL/L (ref 98–107)
CHLORIDE BLD-SCNC: 111 MMOL/L (ref 98–107)
CHLORIDE BLD-SCNC: 111 MMOL/L (ref 98–107)
CO2: 13 MMOL/L (ref 20–31)
CO2: 13 MMOL/L (ref 20–31)
CO2: 14 MMOL/L (ref 20–31)
CREAT SERPL-MCNC: 0.71 MG/DL (ref 0.5–0.9)
CREAT SERPL-MCNC: 0.74 MG/DL (ref 0.5–0.9)
CREAT SERPL-MCNC: 0.83 MG/DL (ref 0.5–0.9)
ESTIMATED AVERAGE GLUCOSE: 283 MG/DL
GFR AFRICAN AMERICAN: >60 ML/MIN
GFR NON-AFRICAN AMERICAN: >60 ML/MIN
GFR SERPL CREATININE-BSD FRML MDRD: ABNORMAL ML/MIN/{1.73_M2}
GLUCOSE BLD-MCNC: 109 MG/DL (ref 65–105)
GLUCOSE BLD-MCNC: 114 MG/DL (ref 65–105)
GLUCOSE BLD-MCNC: 118 MG/DL (ref 70–99)
GLUCOSE BLD-MCNC: 124 MG/DL (ref 70–99)
GLUCOSE BLD-MCNC: 151 MG/DL (ref 65–105)
GLUCOSE BLD-MCNC: 165 MG/DL (ref 65–105)
GLUCOSE BLD-MCNC: 195 MG/DL (ref 65–105)
GLUCOSE BLD-MCNC: 196 MG/DL (ref 65–105)
GLUCOSE BLD-MCNC: 240 MG/DL (ref 65–105)
GLUCOSE BLD-MCNC: 257 MG/DL (ref 65–105)
GLUCOSE BLD-MCNC: 273 MG/DL (ref 65–105)
GLUCOSE BLD-MCNC: 273 MG/DL (ref 70–99)
HBA1C MFR BLD: 11.5 % (ref 4–6)
HCT VFR BLD CALC: 43.8 % (ref 36.3–47.1)
HEMOGLOBIN: 13.6 G/DL (ref 11.9–15.1)
MAGNESIUM: 1.5 MG/DL (ref 1.6–2.6)
MAGNESIUM: 1.6 MG/DL (ref 1.6–2.6)
MAGNESIUM: 1.8 MG/DL (ref 1.6–2.6)
MCH RBC QN AUTO: 28.2 PG (ref 25.2–33.5)
MCHC RBC AUTO-ENTMCNC: 31.1 G/DL (ref 28.4–34.8)
MCV RBC AUTO: 90.9 FL (ref 82.6–102.9)
NRBC AUTOMATED: 0 PER 100 WBC
PDW BLD-RTO: 15.9 % (ref 11.8–14.4)
PHOSPHORUS: 0.8 MG/DL (ref 2.6–4.5)
PHOSPHORUS: 1 MG/DL (ref 2.6–4.5)
PHOSPHORUS: 2.1 MG/DL (ref 2.6–4.5)
PLATELET # BLD: 203 K/UL (ref 138–453)
PMV BLD AUTO: 10.8 FL (ref 8.1–13.5)
POTASSIUM SERPL-SCNC: 3.5 MMOL/L (ref 3.7–5.3)
POTASSIUM SERPL-SCNC: 3.6 MMOL/L (ref 3.7–5.3)
POTASSIUM SERPL-SCNC: 3.6 MMOL/L (ref 3.7–5.3)
RBC # BLD: 4.82 M/UL (ref 3.95–5.11)
SODIUM BLD-SCNC: 135 MMOL/L (ref 135–144)
SODIUM BLD-SCNC: 138 MMOL/L (ref 135–144)
SODIUM BLD-SCNC: 139 MMOL/L (ref 135–144)
WBC # BLD: 7.6 K/UL (ref 3.5–11.3)

## 2020-06-30 PROCEDURE — 6360000002 HC RX W HCPCS: Performed by: NURSE PRACTITIONER

## 2020-06-30 PROCEDURE — 99239 HOSP IP/OBS DSCHRG MGMT >30: CPT | Performed by: INTERNAL MEDICINE

## 2020-06-30 PROCEDURE — 6370000000 HC RX 637 (ALT 250 FOR IP): Performed by: INTERNAL MEDICINE

## 2020-06-30 PROCEDURE — 82947 ASSAY GLUCOSE BLOOD QUANT: CPT

## 2020-06-30 PROCEDURE — 83735 ASSAY OF MAGNESIUM: CPT

## 2020-06-30 PROCEDURE — 2580000003 HC RX 258: Performed by: NURSE PRACTITIONER

## 2020-06-30 PROCEDURE — 2500000003 HC RX 250 WO HCPCS: Performed by: NURSE PRACTITIONER

## 2020-06-30 PROCEDURE — 36415 COLL VENOUS BLD VENIPUNCTURE: CPT

## 2020-06-30 PROCEDURE — 83036 HEMOGLOBIN GLYCOSYLATED A1C: CPT

## 2020-06-30 PROCEDURE — 6370000000 HC RX 637 (ALT 250 FOR IP): Performed by: STUDENT IN AN ORGANIZED HEALTH CARE EDUCATION/TRAINING PROGRAM

## 2020-06-30 PROCEDURE — 85027 COMPLETE CBC AUTOMATED: CPT

## 2020-06-30 PROCEDURE — 84100 ASSAY OF PHOSPHORUS: CPT

## 2020-06-30 PROCEDURE — 80048 BASIC METABOLIC PNL TOTAL CA: CPT

## 2020-06-30 RX ORDER — MAGNESIUM SULFATE 1 G/100ML
1 INJECTION INTRAVENOUS
Status: DISCONTINUED | OUTPATIENT
Start: 2020-06-30 | End: 2020-06-30 | Stop reason: HOSPADM

## 2020-06-30 RX ORDER — INSULIN GLARGINE 100 [IU]/ML
15 INJECTION, SOLUTION SUBCUTANEOUS 2 TIMES DAILY
Status: DISCONTINUED | OUTPATIENT
Start: 2020-06-30 | End: 2020-06-30 | Stop reason: HOSPADM

## 2020-06-30 RX ADMIN — POTASSIUM CHLORIDE 10 MEQ: 7.46 INJECTION, SOLUTION INTRAVENOUS at 01:20

## 2020-06-30 RX ADMIN — POTASSIUM CHLORIDE 10 MEQ: 7.46 INJECTION, SOLUTION INTRAVENOUS at 06:33

## 2020-06-30 RX ADMIN — POTASSIUM CHLORIDE 10 MEQ: 7.46 INJECTION, SOLUTION INTRAVENOUS at 02:26

## 2020-06-30 RX ADMIN — POTASSIUM CHLORIDE 40 MEQ: 1500 TABLET, EXTENDED RELEASE ORAL at 08:58

## 2020-06-30 RX ADMIN — ENOXAPARIN SODIUM 40 MG: 40 INJECTION SUBCUTANEOUS at 08:57

## 2020-06-30 RX ADMIN — INSULIN GLARGINE 15 UNITS: 100 INJECTION, SOLUTION SUBCUTANEOUS at 10:24

## 2020-06-30 RX ADMIN — SODIUM PHOSPHATE, MONOBASIC, MONOHYDRATE 20 MMOL: 276; 142 INJECTION, SOLUTION INTRAVENOUS at 01:45

## 2020-06-30 RX ADMIN — DEXTROSE AND SODIUM CHLORIDE: 5; 450 INJECTION, SOLUTION INTRAVENOUS at 01:24

## 2020-06-30 RX ADMIN — POTASSIUM CHLORIDE 10 MEQ: 7.46 INJECTION, SOLUTION INTRAVENOUS at 03:29

## 2020-06-30 RX ADMIN — ONDANSETRON 4 MG: 2 INJECTION INTRAMUSCULAR; INTRAVENOUS at 00:35

## 2020-06-30 RX ADMIN — POTASSIUM CHLORIDE 10 MEQ: 7.46 INJECTION, SOLUTION INTRAVENOUS at 05:33

## 2020-06-30 ASSESSMENT — PAIN SCALES - GENERAL
PAINLEVEL_OUTOF10: 0

## 2020-06-30 NOTE — ED NOTES
Pt remains resting in stretcher with eyes closed, visible chest rise, awakens with painful stimuli, will continue to monior     Jacobo Carvajal, RN  06/29/20 2044

## 2020-06-30 NOTE — PROGRESS NOTES
Occupational Therapy    Occupational Therapy Not Seen Note    DATE: 2020  Name: Carroll Moyer  : 1995  MRN: [id number]    Patient not available for Occupational Therapy due to:    Pt independent with ADL's and functional mobility.  Pt with no OT acute care needs at this time, will defer OT eval.    Electronically signed by EVE Ybarra on 2020 at 9:50 AM

## 2020-06-30 NOTE — PROGRESS NOTES
Upon arrival to floor. Pt aunt and mom called with concerns of how pt was going to get home. Pt was traveling with a friend via [place]. Pt is not from [place] and does not know anyone in the area. Pt friend is planning on leaving in the morning. Pt friend called pt stating she needs to leave tonight that hospital cannot keep her. RN and Charge asked when he was planning on leaving in the morning for [place]. The friend stated between 7-9. RN and charge were able to get both to agree to let her stay during the night til the morning. SERA Boyd was notified.

## 2020-06-30 NOTE — ED NOTES
Report given to Mir Aponte RN, vebalized no questions, pt to floor when bed is clean.       Priscila Tobar RN  06/29/20 2470

## 2020-06-30 NOTE — PROGRESS NOTES
Nutrition Education    Type and Reason for Visit: Consult, Patient Education    Nutrition Assessment:  Pt consulted due to admit for DKA. Attempted to give pt education on carb counting, but pt was not receptive. Pt was ready to leave. Chart review revealed no insulin for 3-4 days. Pt stated she had poor appetite due to vomiting before and at admission. · Verbally reviewed information with Patient  · Written educational materials provided. · Contact name and number provided. · Refer to Patient Education activity for more details.     Electronically signed by Na Reed RD, LD on 6/30/20 at 10:40 AM EDT    Contact Number: [phone]

## 2020-06-30 NOTE — ED NOTES
Provider contacted via [employer] for further instructions on insulin drip due to pts  at 2020.         Melida Hendrickson RN  06/29/20 2027

## 2020-06-30 NOTE — FLOWSHEET NOTE
06/30/20 0278   Provider Notification   Reason for Communication Critical Value (comment)  (phos 0.8)   Provider Name Dr Michelle Ramirez   Provider Notification Physician   Method of Communication Secure Message   Response Waiting for response   Notification Time 3391

## 2020-06-30 NOTE — PROGRESS NOTES
This patient has signed out [place]. RN educated patient on the risks associated with leaving AMA as well as informed her of pertinent lab values that were not within normal limits, but patient insisted her ride was here and she had to go. Removed pt IV and she was escorted to ER exit with RN.

## 2020-06-30 NOTE — DISCHARGE SUMMARY
[facility] Hospitalist Discharge Summary-Internal Medicine. Patient Identification:   Shayy Dee   : 1995  MRN: [id number]   Account: [de-identified]      Patient's PCP: No primary care provider on file. Admit Date: 2020     Discharge Date:   20    Admitting Physician: Ilsa Tovar MD     Discharge Physician: Magdy Navarro MD     Discharge Diagnoses: Active Hospital Problems    Diagnosis Date Noted    Dehydration [E86.0] 2020    Class 1 obesity without serious comorbidity with body mass index (BMI) of 31.0 to 31.9 in adult [E66.9, Z68.31] 2020    Electrolyte imbalance [E87.8] 2020    Diabetic ketoacidosis without coma associated with type 1 diabetes mellitus ([facility] [E10.10] 2020    GHAZALA (acute kidney injury) ([facility] [N17.9] 2020    Noncompliance [Z91.19] 2020       The patient was seen and examined on day of discharge and this discharge summary is in conjunction with any daily progress note from day of discharge. Hospital Course:   Shayy Dee is a 26 y.o. female admitted to [address] [place] [address] on 2020 for nausea and vomiting due to diabetic ketoacidosis-resolved. Type 1 diabetes mellitus with poor insulin compliancy. Hemoglobin A1c 11.7. Resolved GHAZALA due to ATN from dehydration. Electrolyte imbalance corrected. [place] Patient left AGAINST MEDICAL ADVICE prior to electrolyte imbalance correction. Follow-up with PCP in 1 week. HPI and Physical Exam:    27 yo insulin dependent Dm2 visiting from out of town presented to ED with c/o nausea, vomiting for 2 days. Denies abdominal pain, no chest pain/SOB/fever/chills. No dysuria. denies known sick contacts.       Vitals:  Vitals:    20 0415 20 0600 20 0918 20 1000   BP: 126/63  125/76    Pulse: 76  83 95   Resp: 15  23 21   Temp: 98.3 °F (36.8 injury. Right lower leg: No edema. Left lower leg: No edema. Lymphadenopathy:      Cervical: No cervical adenopathy. Skin:     General: Skin is warm and dry. Coloration: Skin is not jaundiced or pale. Findings: No bruising, erythema, lesion or rash. Neurological:      General: No focal deficit present. Mental Status: She is alert. She is disoriented. Cranial Nerves: No cranial nerve deficit. Sensory: No sensory deficit. Motor: No weakness. Coordination: Coordination normal.      Gait: Gait normal.      Deep Tendon Reflexes: Reflexes normal.   Psychiatric:         Mood and Affect: Mood normal.         Behavior: Behavior normal.         Thought Content: Thought content normal.         Judgment: Judgment normal.       CBC:    Lab Results   Component Value Date    WBC 7.6 06/30/2020    HGB 13.6 06/30/2020    HCT 43.8 06/30/2020     06/30/2020       Renal:    Lab Results   Component Value Date     06/30/2020    K 3.6 06/30/2020     06/30/2020    CO2 14 06/30/2020    BUN 3 06/30/2020    CREATININE 0.71 06/30/2020    CALCIUM 8.7 06/30/2020    PHOS 0.8 06/30/2020     Results for Nestor Acosta (MRN [id number]) as of 6/30/2020 08:25   Ref. Range 6/29/2020 00:16   Hemoglobin A1C Latest Ref Range: 4.0 - 6.0 % 11.7 (H)     URINALYSIS. Results for Nestor Acosta (MRN [id number]) as of 6/30/2020 08:25   Ref.  Range 6/28/2020 01:07   Color, UA Latest Ref Range: YELLOW  YELLOW   Turbidity UA Latest Ref Range: CLEAR  CLEAR   Glucose, UA Latest Ref Range: NEGATIVE  3+ (A)   Bilirubin, Urine Latest Ref Range: NEGATIVE  NEGATIVE   Ketones, Urine Latest Ref Range: NEGATIVE  LARGE (A)   Specific [place], UA Latest Ref Range: 1.005 - 1.030  1.025   pH, UA Latest Ref Range: 5.0 - 8.0  5.0   Protein, UA Latest Ref Range: NEGATIVE  2+ (A)   Urobilinogen, Urine Latest Ref Range: Normal  Normal   Nitrite, Urine Latest Ref Range: NEGATIVE  NEGATIVE   Leukocyte Esterase, Urine Latest Ref Range: NEGATIVE  NEGATIVE   Urinalysis Comments Unknown NOT REPORTED   Casts UA Latest Ref Range: 0 - 8 /LPF 2 TO 5 HYALINE Reference range defined for non-centrifuged specimen. Mucus, UA Latest Ref Range: None  NOT REPORTED   WBC, UA Latest Ref Range: 0 - 5 /HPF 5 TO 10   RBC, UA Latest Ref Range: 0 - 4 /HPF 0 TO 2   Epithelial Cells, UA Latest Ref Range: 0 - 5 /HPF 5 TO 10   Renal Epithelial, UA Latest Ref Range: 0 /HPF NOT REPORTED   Bacteria, UA Latest Ref Range: None  NOT REPORTED   Amorphous, UA Latest Ref Range: None  NOT REPORTED   Yeast, Urine Latest Ref Range: None  NOT REPORTED   Crystals, UA Latest Ref Range: None /HPF NOT REPORTED   Urine Hgb Latest Ref Range: NEGATIVE  MODERATE (A)   Trichomonas, UA Latest Ref Range: None  NOT REPORTED   Other Observations UA Latest Ref Range: NOT REQ.  NOT REPORTED       MICROBIOLOGY AND PATHOLOGY STUDIES. None. TOXICOLOGY. None. PROCEDURES. None. ENDOSCOPE STUDIES. None. RADIOLOGY STUDIES. No orders to display          Consults:     IP CONSULT TO HOSPITALIST  IP CONSULT TO DIABETES EDUCATOR  IP CONSULT TO DIETITIAN    Disposition:    [x] Home       [] TCU       [] Rehab       [] Psych       [] SNF       [] [place]       [] Other-    Condition at Discharge: Stable    Code Status:  Prior     Patient Instructions:    Discharge lab work: Activity: activity as tolerated  Diet: No diet orders on file      Follow-up visits:   No follow-up provider specified.        Discharge Medications:      L[place] Susan   Home Medication Instructions [id number]:[id number]    Printed on:07/02/20 2003   Medication Information                      insulin NPH (NOVOLIN N) 100 UNIT/ML injection vial  Inject 30 Units into the skin 2 times daily (before meals)             insulin regular (HUMULIN R;NOVOLIN R) 100 UNIT/ML injection  Inject into the skin See Admin Instructions                 Time Spent on discharge is more than 30 minutes in the examination, evaluation, counseling and review of medications and discharge plan. Signed: Thank you No primary care provider on file. for the opportunity to be involved in this patient's care.     Electronically signed by Nannette Gould MD on 7/2/2020 at 8:04 AM

## 2020-06-30 NOTE — PLAN OF CARE
Problem: Discharge Planning:  Goal: Discharged to appropriate level of care  Description: Discharged to appropriate level of care  Outcome: Ongoing     Problem: Fluid Volume - Imbalance:  Goal: Will remain free of signs and symptoms of dehydration  Description: Will remain free of signs and symptoms of dehydration  Outcome: Ongoing  Goal: Absence of imbalanced fluid volume signs and symptoms  Description: Absence of imbalanced fluid volume signs and symptoms  Outcome: Ongoing     Problem: Serum Glucose Level - Abnormal:  Goal: Ability to maintain appropriate glucose levels will improve  Description: Ability to maintain appropriate glucose levels will improve  Outcome: Ongoing     Problem: Injury - Acid Base Imbalance:  Goal: Acid-base balance  Description: Acid-base balance  Outcome: Ongoing

## 2020-06-30 NOTE — PROGRESS NOTES
Physical Therapy  DATE: 2020    NAME: Scott Hernandez  MRN: [id number]   : 1995    Patient not seen this date for Physical Therapy due to:  [] Blood transfusion in progress  [] Hemodialysis  []  Patient Declined  [] Spine Precautions   [] Strict Bedrest  [] Surgery/ Procedure  [] Testing      [] Other        [x] PT being discontinued at this time. Patient independently performing functional mobility, RN and pt in agreement. No further needs. [] PT being discontinued at this time as the patient has been transferred to palliative care. No further needs.     Amanda Schroeder, PT

## 2020-07-30 PROBLEM — E86.0 DEHYDRATION: Status: RESOLVED | Noted: 2020-06-30 | Resolved: 2020-07-30
